# Patient Record
Sex: FEMALE | Race: WHITE | Employment: OTHER | URBAN - METROPOLITAN AREA
[De-identification: names, ages, dates, MRNs, and addresses within clinical notes are randomized per-mention and may not be internally consistent; named-entity substitution may affect disease eponyms.]

---

## 2017-12-27 ENCOUNTER — HOSPITAL ENCOUNTER (INPATIENT)
Age: 72
LOS: 6 days | Discharge: HOME HEALTH | DRG: 191 | End: 2018-01-02
Attending: EMERGENCY MEDICINE | Admitting: INTERNAL MEDICINE
Payer: MEDICARE

## 2017-12-27 ENCOUNTER — APPOINTMENT (OUTPATIENT)
Dept: GENERAL RADIOLOGY | Age: 72
DRG: 191 | End: 2017-12-27
Attending: EMERGENCY MEDICINE
Payer: MEDICARE

## 2017-12-27 DIAGNOSIS — J44.1 COPD EXACERBATION (HCC): Primary | ICD-10-CM

## 2017-12-27 PROBLEM — J40 BRONCHITIS: Status: ACTIVE | Noted: 2017-12-27

## 2017-12-27 PROBLEM — E86.0 DEHYDRATION: Status: ACTIVE | Noted: 2017-12-27

## 2017-12-27 PROBLEM — I10 HTN (HYPERTENSION): Status: ACTIVE | Noted: 2017-12-27

## 2017-12-27 LAB
ALBUMIN SERPL-MCNC: 3.5 G/DL (ref 3.4–5)
ALBUMIN/GLOB SERPL: 0.9 {RATIO} (ref 0.8–1.7)
ALP SERPL-CCNC: 112 U/L (ref 45–117)
ALT SERPL-CCNC: 23 U/L (ref 13–56)
ANION GAP SERPL CALC-SCNC: 12 MMOL/L (ref 3–18)
ARTERIAL PATENCY WRIST A: YES
AST SERPL-CCNC: 17 U/L (ref 15–37)
BASE EXCESS BLD CALC-SCNC: 3 MMOL/L
BASOPHILS # BLD: 0 K/UL (ref 0–0.06)
BASOPHILS NFR BLD: 0 % (ref 0–2)
BDY SITE: ABNORMAL
BILIRUB SERPL-MCNC: 0.5 MG/DL (ref 0.2–1)
BUN SERPL-MCNC: 13 MG/DL (ref 7–18)
BUN/CREAT SERPL: 14 (ref 12–20)
CALCIUM SERPL-MCNC: 9.8 MG/DL (ref 8.5–10.1)
CHLORIDE SERPL-SCNC: 107 MMOL/L (ref 100–108)
CK MB CFR SERPL CALC: NORMAL % (ref 0–4)
CK MB SERPL-MCNC: <1 NG/ML (ref 5–25)
CK SERPL-CCNC: 60 U/L (ref 26–192)
CO2 SERPL-SCNC: 28 MMOL/L (ref 21–32)
CREAT SERPL-MCNC: 0.94 MG/DL (ref 0.6–1.3)
DIFFERENTIAL METHOD BLD: ABNORMAL
EOSINOPHIL # BLD: 0 K/UL (ref 0–0.4)
EOSINOPHIL NFR BLD: 0 % (ref 0–5)
ERYTHROCYTE [DISTWIDTH] IN BLOOD BY AUTOMATED COUNT: 13.6 % (ref 11.6–14.5)
EST. AVERAGE GLUCOSE BLD GHB EST-MCNC: 117 MG/DL
GAS FLOW.O2 O2 DELIVERY SYS: ABNORMAL L/MIN
GLOBULIN SER CALC-MCNC: 3.7 G/DL (ref 2–4)
GLUCOSE BLD STRIP.AUTO-MCNC: 146 MG/DL (ref 70–110)
GLUCOSE BLD STRIP.AUTO-MCNC: 191 MG/DL (ref 70–110)
GLUCOSE SERPL-MCNC: 131 MG/DL (ref 74–99)
HBA1C MFR BLD: 5.7 % (ref 4.5–5.6)
HCO3 BLD-SCNC: 27.2 MMOL/L (ref 22–26)
HCT VFR BLD AUTO: 45 % (ref 35–45)
HGB BLD-MCNC: 15.1 G/DL (ref 12–16)
INR PPP: 0.9 (ref 0.8–1.2)
LACTATE SERPL-SCNC: 2.1 MMOL/L (ref 0.4–2)
LACTATE SERPL-SCNC: 2.1 MMOL/L (ref 0.4–2)
LACTATE SERPL-SCNC: 2.6 MMOL/L (ref 0.4–2)
LYMPHOCYTES # BLD: 1.1 K/UL (ref 0.9–3.6)
LYMPHOCYTES NFR BLD: 8 % (ref 21–52)
MAGNESIUM SERPL-MCNC: 1.6 MG/DL (ref 1.6–2.6)
MCH RBC QN AUTO: 30.8 PG (ref 24–34)
MCHC RBC AUTO-ENTMCNC: 33.6 G/DL (ref 31–37)
MCV RBC AUTO: 91.8 FL (ref 74–97)
MONOCYTES # BLD: 0.5 K/UL (ref 0.05–1.2)
MONOCYTES NFR BLD: 4 % (ref 3–10)
NEUTS SEG # BLD: 12.5 K/UL (ref 1.8–8)
NEUTS SEG NFR BLD: 88 % (ref 40–73)
O2/TOTAL GAS SETTING VFR VENT: 21 %
PCO2 BLD: 40.3 MMHG (ref 35–45)
PH BLD: 7.44 [PH] (ref 7.35–7.45)
PLATELET # BLD AUTO: 280 K/UL (ref 135–420)
PMV BLD AUTO: 9.8 FL (ref 9.2–11.8)
PO2 BLD: 57 MMHG (ref 80–100)
POTASSIUM SERPL-SCNC: 3.4 MMOL/L (ref 3.5–5.5)
PROT SERPL-MCNC: 7.2 G/DL (ref 6.4–8.2)
PROTHROMBIN TIME: 12 SEC (ref 11.5–15.2)
RBC # BLD AUTO: 4.9 M/UL (ref 4.2–5.3)
SAO2 % BLD: 90 % (ref 92–97)
SERVICE CMNT-IMP: ABNORMAL
SODIUM SERPL-SCNC: 147 MMOL/L (ref 136–145)
SPECIMEN TYPE: ABNORMAL
TROPONIN I SERPL-MCNC: <0.02 NG/ML (ref 0–0.06)
WBC # BLD AUTO: 14.2 K/UL (ref 4.6–13.2)

## 2017-12-27 PROCEDURE — 74011250636 HC RX REV CODE- 250/636: Performed by: EMERGENCY MEDICINE

## 2017-12-27 PROCEDURE — 94640 AIRWAY INHALATION TREATMENT: CPT

## 2017-12-27 PROCEDURE — 74011250636 HC RX REV CODE- 250/636: Performed by: INTERNAL MEDICINE

## 2017-12-27 PROCEDURE — 80053 COMPREHEN METABOLIC PANEL: CPT | Performed by: EMERGENCY MEDICINE

## 2017-12-27 PROCEDURE — 74011636637 HC RX REV CODE- 636/637: Performed by: INTERNAL MEDICINE

## 2017-12-27 PROCEDURE — 83605 ASSAY OF LACTIC ACID: CPT | Performed by: INTERNAL MEDICINE

## 2017-12-27 PROCEDURE — 83605 ASSAY OF LACTIC ACID: CPT | Performed by: EMERGENCY MEDICINE

## 2017-12-27 PROCEDURE — 85025 COMPLETE CBC W/AUTO DIFF WBC: CPT | Performed by: EMERGENCY MEDICINE

## 2017-12-27 PROCEDURE — 77030013140 HC MSK NEB VYRM -A

## 2017-12-27 PROCEDURE — 99285 EMERGENCY DEPT VISIT HI MDM: CPT

## 2017-12-27 PROCEDURE — 93005 ELECTROCARDIOGRAM TRACING: CPT

## 2017-12-27 PROCEDURE — 82803 BLOOD GASES ANY COMBINATION: CPT

## 2017-12-27 PROCEDURE — 71010 XR CHEST PORT: CPT

## 2017-12-27 PROCEDURE — 74011000250 HC RX REV CODE- 250: Performed by: INTERNAL MEDICINE

## 2017-12-27 PROCEDURE — 87040 BLOOD CULTURE FOR BACTERIA: CPT | Performed by: EMERGENCY MEDICINE

## 2017-12-27 PROCEDURE — 83036 HEMOGLOBIN GLYCOSYLATED A1C: CPT | Performed by: INTERNAL MEDICINE

## 2017-12-27 PROCEDURE — 74011250637 HC RX REV CODE- 250/637: Performed by: INTERNAL MEDICINE

## 2017-12-27 PROCEDURE — 85610 PROTHROMBIN TIME: CPT | Performed by: EMERGENCY MEDICINE

## 2017-12-27 PROCEDURE — 83735 ASSAY OF MAGNESIUM: CPT | Performed by: EMERGENCY MEDICINE

## 2017-12-27 PROCEDURE — 96374 THER/PROPH/DIAG INJ IV PUSH: CPT

## 2017-12-27 PROCEDURE — 36415 COLL VENOUS BLD VENIPUNCTURE: CPT | Performed by: INTERNAL MEDICINE

## 2017-12-27 PROCEDURE — 65660000000 HC RM CCU STEPDOWN

## 2017-12-27 PROCEDURE — 82550 ASSAY OF CK (CPK): CPT | Performed by: EMERGENCY MEDICINE

## 2017-12-27 PROCEDURE — 82962 GLUCOSE BLOOD TEST: CPT

## 2017-12-27 PROCEDURE — 74011000250 HC RX REV CODE- 250: Performed by: EMERGENCY MEDICINE

## 2017-12-27 PROCEDURE — 94762 N-INVAS EAR/PLS OXIMTRY CONT: CPT

## 2017-12-27 PROCEDURE — 36600 WITHDRAWAL OF ARTERIAL BLOOD: CPT

## 2017-12-27 RX ORDER — FLUTICASONE PROPIONATE AND SALMETEROL 500; 50 UG/1; UG/1
1 POWDER RESPIRATORY (INHALATION) EVERY 12 HOURS
COMMUNITY

## 2017-12-27 RX ORDER — ALBUTEROL SULFATE 0.83 MG/ML
2.5 SOLUTION RESPIRATORY (INHALATION)
Status: DISCONTINUED | OUTPATIENT
Start: 2017-12-27 | End: 2018-01-02 | Stop reason: HOSPADM

## 2017-12-27 RX ORDER — IPRATROPIUM BROMIDE AND ALBUTEROL SULFATE 2.5; .5 MG/3ML; MG/3ML
3 SOLUTION RESPIRATORY (INHALATION)
Status: DISCONTINUED | OUTPATIENT
Start: 2017-12-27 | End: 2018-01-02 | Stop reason: HOSPADM

## 2017-12-27 RX ORDER — PREDNISONE 20 MG/1
60 TABLET ORAL
COMMUNITY
End: 2018-01-02

## 2017-12-27 RX ORDER — PANTOPRAZOLE SODIUM 40 MG/1
40 TABLET, DELAYED RELEASE ORAL DAILY
Status: ON HOLD | COMMUNITY
End: 2017-12-27

## 2017-12-27 RX ORDER — ATORVASTATIN CALCIUM 80 MG/1
80 TABLET, FILM COATED ORAL EVERY EVENING
COMMUNITY

## 2017-12-27 RX ORDER — LANOLIN ALCOHOL/MO/W.PET/CERES
1000 CREAM (GRAM) TOPICAL DAILY
COMMUNITY

## 2017-12-27 RX ORDER — MAGNESIUM SULFATE 100 %
4 CRYSTALS MISCELLANEOUS AS NEEDED
Status: DISCONTINUED | OUTPATIENT
Start: 2017-12-27 | End: 2018-01-02 | Stop reason: HOSPADM

## 2017-12-27 RX ORDER — AMITRIPTYLINE HYDROCHLORIDE 50 MG/1
50 TABLET, FILM COATED ORAL
Status: ON HOLD | COMMUNITY
End: 2017-12-27

## 2017-12-27 RX ORDER — ALBUTEROL SULFATE 0.83 MG/ML
5 SOLUTION RESPIRATORY (INHALATION)
Status: COMPLETED | OUTPATIENT
Start: 2017-12-27 | End: 2017-12-27

## 2017-12-27 RX ORDER — MULTIVITAMIN
1 TABLET ORAL DAILY
COMMUNITY

## 2017-12-27 RX ORDER — DOXYCYCLINE 100 MG/1
100 CAPSULE ORAL EVERY 12 HOURS
Status: DISCONTINUED | OUTPATIENT
Start: 2017-12-27 | End: 2017-12-27 | Stop reason: SDUPTHER

## 2017-12-27 RX ORDER — PANTOPRAZOLE SODIUM 40 MG/1
40 TABLET, DELAYED RELEASE ORAL 2 TIMES DAILY
COMMUNITY

## 2017-12-27 RX ORDER — INSULIN LISPRO 100 [IU]/ML
INJECTION, SOLUTION INTRAVENOUS; SUBCUTANEOUS
Status: DISCONTINUED | OUTPATIENT
Start: 2017-12-27 | End: 2018-01-02 | Stop reason: HOSPADM

## 2017-12-27 RX ORDER — HEPARIN SODIUM 5000 [USP'U]/ML
5000 INJECTION, SOLUTION INTRAVENOUS; SUBCUTANEOUS EVERY 8 HOURS
Status: DISCONTINUED | OUTPATIENT
Start: 2017-12-27 | End: 2018-01-02 | Stop reason: HOSPADM

## 2017-12-27 RX ORDER — AMITRIPTYLINE HYDROCHLORIDE 25 MG/1
75 TABLET, FILM COATED ORAL
COMMUNITY
End: 2020-10-14

## 2017-12-27 RX ORDER — DOXYCYCLINE 100 MG/1
100 CAPSULE ORAL 2 TIMES DAILY
COMMUNITY
Start: 2017-12-22 | End: 2018-01-02

## 2017-12-27 RX ORDER — LANOLIN ALCOHOL/MO/W.PET/CERES
CREAM (GRAM) TOPICAL
COMMUNITY

## 2017-12-27 RX ORDER — ACETAMINOPHEN 325 MG/1
650 TABLET ORAL
Status: DISCONTINUED | OUTPATIENT
Start: 2017-12-27 | End: 2018-01-02 | Stop reason: HOSPADM

## 2017-12-27 RX ORDER — IPRATROPIUM BROMIDE AND ALBUTEROL SULFATE 2.5; .5 MG/3ML; MG/3ML
3 SOLUTION RESPIRATORY (INHALATION) ONCE
Status: COMPLETED | OUTPATIENT
Start: 2017-12-27 | End: 2017-12-27

## 2017-12-27 RX ORDER — SODIUM CHLORIDE 9 MG/ML
125 INJECTION, SOLUTION INTRAVENOUS CONTINUOUS
Status: DISCONTINUED | OUTPATIENT
Start: 2017-12-27 | End: 2017-12-30

## 2017-12-27 RX ORDER — CYANOCOBALAMIN (VITAMIN B-12) 500 MCG
400 TABLET ORAL DAILY
COMMUNITY

## 2017-12-27 RX ORDER — DOXYCYCLINE 100 MG/1
100 CAPSULE ORAL EVERY 12 HOURS
Status: DISCONTINUED | OUTPATIENT
Start: 2017-12-27 | End: 2018-01-02 | Stop reason: HOSPADM

## 2017-12-27 RX ORDER — BIOTIN 1000 MCG
1000 TABLET,CHEWABLE ORAL 4 TIMES DAILY
COMMUNITY

## 2017-12-27 RX ORDER — DEXTROSE 50 % IN WATER (D50W) INTRAVENOUS SYRINGE
25-50 AS NEEDED
Status: DISCONTINUED | OUTPATIENT
Start: 2017-12-27 | End: 2018-01-02 | Stop reason: HOSPADM

## 2017-12-27 RX ORDER — SODIUM CHLORIDE 9 MG/ML
200 INJECTION, SOLUTION INTRAVENOUS CONTINUOUS
Status: DISPENSED | OUTPATIENT
Start: 2017-12-27 | End: 2017-12-27

## 2017-12-27 RX ADMIN — METHYLPREDNISOLONE SODIUM SUCCINATE 40 MG: 40 INJECTION, POWDER, FOR SOLUTION INTRAMUSCULAR; INTRAVENOUS at 23:55

## 2017-12-27 RX ADMIN — METHYLPREDNISOLONE SODIUM SUCCINATE 40 MG: 40 INJECTION, POWDER, FOR SOLUTION INTRAMUSCULAR; INTRAVENOUS at 17:30

## 2017-12-27 RX ADMIN — IPRATROPIUM BROMIDE AND ALBUTEROL SULFATE 3 ML: .5; 3 SOLUTION RESPIRATORY (INHALATION) at 13:24

## 2017-12-27 RX ADMIN — SODIUM CHLORIDE 200 ML/HR: 900 INJECTION, SOLUTION INTRAVENOUS at 16:56

## 2017-12-27 RX ADMIN — ALBUTEROL SULFATE 5 MG: 2.5 SOLUTION RESPIRATORY (INHALATION) at 14:18

## 2017-12-27 RX ADMIN — METHYLPREDNISOLONE SODIUM SUCCINATE 125 MG: 125 INJECTION, POWDER, FOR SOLUTION INTRAMUSCULAR; INTRAVENOUS at 14:56

## 2017-12-27 RX ADMIN — IPRATROPIUM BROMIDE AND ALBUTEROL SULFATE 3 ML: .5; 3 SOLUTION RESPIRATORY (INHALATION) at 19:24

## 2017-12-27 RX ADMIN — SODIUM CHLORIDE 125 ML/HR: 900 INJECTION, SOLUTION INTRAVENOUS at 23:00

## 2017-12-27 RX ADMIN — INSULIN LISPRO 2 UNITS: 100 INJECTION, SOLUTION INTRAVENOUS; SUBCUTANEOUS at 21:08

## 2017-12-27 RX ADMIN — DOXYCYCLINE 100 MG: 100 CAPSULE ORAL at 21:01

## 2017-12-27 RX ADMIN — HEPARIN SODIUM 5000 UNITS: 5000 INJECTION, SOLUTION INTRAVENOUS; SUBCUTANEOUS at 17:28

## 2017-12-27 NOTE — IP AVS SNAPSHOT
[address] [address] 
[phone] Patient: Lydia Richards MRN: [id number] DLI:[id number] About your hospitalization You were admitted on:  December 27, 2017 You last received care in the:  [facility] You were discharged on:  January 2, 2018 Why you were hospitalized Your primary diagnosis was:  Copd Exacerbation (Hcc) Your diagnoses also included:  Bronchitis, Dehydration, Htn (Hypertension) Follow-up Information Follow up With Details Comments Contact Info Elissa Jung to continue managing your healthcare needs. [phone] Slade German MD On 1/5/2018 Follow-up appointment @ 8:00 a.m. [place] [address] 
[phone] Discharge Orders None A check bunny indicates which time of day the medication should be taken. My Medications CHANGE how you take these medications Instructions Each Dose to Equal  
 Morning Noon Evening Bedtime  
 predniSONE 10 mg tablet Commonly known as:  Zeinab Khan What changed:   
- medication strength 
- how much to take 
- how to take this - when to take this 
- additional instructions Your last dose was: Your next dose is:    
   
   
 Prednisone 10mg tabs: p.o. 4 tabs daily for 3 days then drop to  3 tabs daily for 3 days then drop to  2 tabs daily for 3 days then drop to  1 tab daily for 3 days then stop. Dispense 30 tabs CONTINUE taking these medications Instructions Each Dose to Equal  
 Morning Noon Evening Bedtime AGGRENOX  mg per SR capsule Generic drug:  aspirin-dipyridamole Your last dose was: Your next dose is: Take 1 Cap by mouth two (2) times a day. Indications: PREVENTION OF CEREBRAL THROMBOSIS, h/o TIA  
 1 Cap  
    
   
   
   
  
 albuterol 90 mcg/actuation inhaler Commonly known as:  Sujatha Payton Your last dose was: Your next dose is: Take 2 Puffs by inhalation every six (6) hours as needed. 2 Puff  
    
   
   
   
  
 amitriptyline 25 mg tablet Commonly known as:  ELAVIL Your last dose was: Your next dose is: Take 75 mg by mouth nightly. Indications: MIGRAINE PREVENTION  
 75 mg  
    
   
   
   
  
 atenolol 25 mg tablet Commonly known as:  TENORMIN Your last dose was: Your next dose is: Take 25 mg by mouth every evening. Indications: MIGRAINE PREVENTION, to manage SE of amitriptyline 25 mg  
    
   
   
   
  
 biotin 1,000 mcg Chew Your last dose was: Your next dose is: Take 2,000 mcg by mouth two (2) times a day. This herbal, alternative, and/or nutritional/dietary supplement product will not be given during hospital stay per [facility] approved policy. Please reorder upon discharge if appropriate. 2000 mcg  
    
   
   
   
  
 calcium-cholecalciferol (D3) tablet Commonly known as:  CALTRATE 600+D Your last dose was: Your next dose is: Take 1 Tab by mouth daily. 1 Tab  
    
   
   
   
  
 cholecalciferol 400 unit Tab tablet Commonly known as:  VITAMIN D3 Your last dose was: Your next dose is: Take 400 Units by mouth daily. 400 Units  
    
   
   
   
  
 cyanocobalamin 1,000 mcg tablet Your last dose was: Your next dose is: Take 1,000 mcg by mouth daily. 1000 mcg  
    
   
   
   
  
 fluticasone-salmeterol 500-50 mcg/dose diskus inhaler Commonly known as:  ADVAIR Your last dose was: Your next dose is: Take 1 Puff by inhalation every twelve (12) hours. 1 Puff Iron 325 mg (65 mg iron) tablet Generic drug:  ferrous sulfate Your last dose was: Your next dose is: Take  by mouth Daily (before breakfast). LIPITOR 80 mg tablet Generic drug:  atorvastatin Your last dose was: Your next dose is: Take 80 mg by mouth every evening. 80 mg PROTONIX 40 mg tablet Generic drug:  pantoprazole Your last dose was: Your next dose is: Take 40 mg by mouth two (2) times a day. 40 mg  
    
   
   
   
  
 SPIRIVA WITH HANDIHALER 18 mcg inhalation capsule Generic drug:  tiotropium Your last dose was: Your next dose is: Take 1 Cap by inhalation daily. 1 Cap ZETIA 10 mg tablet Generic drug:  ezetimibe Your last dose was: Your next dose is: Take 10 mg by mouth every evening. 10 mg  
    
   
   
   
  
  
STOP taking these medications   
 doxycycline 100 mg capsule Commonly known as:  Lucien  ZANTAC 150 mg tablet Generic drug:  raNITIdine Where to Get Your Medications Information on where to get these meds will be given to you by the nurse or doctor. ! Ask your nurse or doctor about these medications  
  predniSONE 10 mg tablet My Medications STOP taking these medications   
 doxycycline 100 mg capsule Commonly known as:  Lucien  ZANTAC 150 mg tablet Generic drug:  raNITIdine TAKE these medications as instructed Instructions Each Dose to Equal  
 Morning Noon Evening Bedtime AGGRENOX  mg per SR capsule Generic drug:  aspirin-dipyridamole Your last dose was: Your next dose is: Take 1 Cap by mouth two (2) times a day. Indications: PREVENTION OF CEREBRAL THROMBOSIS, h/o TIA  
 1 Cap  
    
   
   
   
  
 albuterol 90 mcg/actuation inhaler Commonly known as:  Tamara Goodpasture Your last dose was: Your next dose is: Take 2 Puffs by inhalation every six (6) hours as needed. 2 Puff  
    
   
   
   
  
 amitriptyline 25 mg tablet Commonly known as:  ELAVIL Your last dose was: Your next dose is: Take 75 mg by mouth nightly. Indications: MIGRAINE PREVENTION  
 75 mg  
    
   
   
   
  
 atenolol 25 mg tablet Commonly known as:  TENORMIN Your last dose was: Your next dose is: Take 25 mg by mouth every evening. Indications: MIGRAINE PREVENTION, to manage SE of amitriptyline 25 mg  
    
   
   
   
  
 biotin 1,000 mcg Chew Your last dose was: Your next dose is: Take 2,000 mcg by mouth two (2) times a day. This herbal, alternative, and/or nutritional/dietary supplement product will not be given during hospital stay per [facility] approved policy. Please reorder upon discharge if appropriate. 2000 mcg  
    
   
   
   
  
 calcium-cholecalciferol (D3) tablet Commonly known as:  CALTRATE 600+D Your last dose was: Your next dose is: Take 1 Tab by mouth daily. 1 Tab  
    
   
   
   
  
 cholecalciferol 400 unit Tab tablet Commonly known as:  VITAMIN D3 Your last dose was: Your next dose is: Take 400 Units by mouth daily. 400 Units  
    
   
   
   
  
 cyanocobalamin 1,000 mcg tablet Your last dose was: Your next dose is: Take 1,000 mcg by mouth daily. 1000 mcg  
    
   
   
   
  
 fluticasone-salmeterol 500-50 mcg/dose diskus inhaler Commonly known as:  ADVAIR Your last dose was: Your next dose is: Take 1 Puff by inhalation every twelve (12) hours. 1 Puff Iron 325 mg (65 mg iron) tablet Generic drug:  ferrous sulfate Your last dose was: Your next dose is: Take  by mouth Daily (before breakfast). LIPITOR 80 mg tablet Generic drug:  atorvastatin Your last dose was: Your next dose is: Take 80 mg by mouth every evening. 80 mg  
    
   
   
   
  
 predniSONE 10 mg tablet Commonly known as:  Clive Seals Your last dose was: Your next dose is:    
   
   
 Prednisone 10mg tabs: p.o. 4 tabs daily for 3 days then drop to  3 tabs daily for 3 days then drop to  2 tabs daily for 3 days then drop to  1 tab daily for 3 days then stop. Dispense 30 tabs PROTONIX 40 mg tablet Generic drug:  pantoprazole Your last dose was: Your next dose is: Take 40 mg by mouth two (2) times a day. 40 mg  
    
   
   
   
  
 SPIRIVA WITH HANDIHALER 18 mcg inhalation capsule Generic drug:  tiotropium Your last dose was: Your next dose is: Take 1 Cap by inhalation daily. 1 Cap ZETIA 10 mg tablet Generic drug:  ezetimibe Your last dose was: Your next dose is: Take 10 mg by mouth every evening. 10 mg Where to Get Your Medications Information on where to get these meds will be given to you by the nurse or doctor. ! Ask your nurse or doctor about these medications  
  predniSONE 10 mg tablet Discharge Instructions DISCHARGE SUMMARY from Nurse PATIENT INSTRUCTIONS: 
 
After general anesthesia or intravenous sedation, for 24 hours or while taking prescription Narcotics: · Limit your activities · Do not drive and operate hazardous machinery · Do not make important personal or business decisions · Do  not drink alcoholic beverages · If you have not urinated within 8 hours after discharge, please contact your surgeon on call. What to do at Home: 
Recommended activity: Activity as tolerated, Any questions If you experience any of the following symptoms: increased pain or shortness of breath, please follow up with your PCP. *  Please give a list of your current medications to your Primary Care Provider. *  Please update this list whenever your medications are discontinued, doses are 
    changed, or new medications (including over-the-counter products) are added. *  Please carry medication information at all times in case of emergency situations. These are general instructions for a healthy lifestyle: No smoking/ No tobacco products/ Avoid exposure to second hand smoke Surgeon General's Warning:  Quitting smoking now greatly reduces serious risk to your health. Obesity, smoking, and sedentary lifestyle greatly increases your risk for illness A healthy diet, regular physical exercise & weight monitoring are important for maintaining a healthy lifestyle You may be retaining fluid if you have a history of heart failure or if you experience any of the following symptoms:  Weight gain of 3 pounds or more overnight or 5 pounds in a week, increased swelling in our hands or feet or shortness of breath while lying flat in bed. Please call your doctor as soon as you notice any of these symptoms; do not wait until your next office visit. Recognize signs and symptoms of STROKE: 
 
F-face looks uneven A-arms unable to move or move unevenly S-speech slurred or non-existent T-time-call 911 as soon as signs and symptoms begin-DO NOT go Back to bed or wait to see if you get better-TIME IS BRAIN. Warning Signs of HEART ATTACK Call 911 if you have these symptoms: 
? Chest discomfort. Most heart attacks involve discomfort in the center of the chest that lasts more than a few minutes, or that goes away and comes back. It can feel like uncomfortable pressure, squeezing, fullness, or pain. ? Discomfort in other areas of the upper body. Symptoms can include pain or discomfort in one or both arms, the back, neck, jaw, or stomach. ? Shortness of breath with or without chest discomfort. ? Other signs may include breaking out in a cold sweat, nausea, or lightheadedness. Don't wait more than five minutes to call [address]! Fast action can save your life. Calling 911 is almost always the fastest way to get lifesaving treatment. Emergency Medical Services staff can begin treatment when they arrive  up to an hour sooner than if someone gets to the hospital by car. The discharge information has been reviewed with the {PATIENT PARENT GUARDIAN:14319}. The {PATIENT PARENT GUARDIAN:78006} verbalized understanding. Discharge medications reviewed with the {Dishcarge meds reviewed CUSX:62979} and appropriate educational materials and side effects teaching were provided. ___________________________________________________________________________________________________________________________________ Chronic Obstructive Pulmonary Disease (COPD): Care Instructions Your Care Instructions Chronic obstructive pulmonary disease (COPD) is a general term for a group of lung diseases, including emphysema and chronic bronchitis. People with COPD have decreased airflow in and out of the lungs, which makes it hard to breathe. The airways also can get clogged with thick mucus. Cigarette smoking is a major cause of COPD. Although there is no cure for COPD, you can slow its progress. Following your treatment plan and taking care of yourself can help you feel better and live longer. Follow-up care is a key part of your treatment and safety. Be sure to make and go to all appointments, and call your doctor if you are having problems. It's also a good idea to know your test results and keep a list of the medicines you take. How can you care for yourself at home? ?Staying healthy ? · Do not smoke. This is the most important step you can take to prevent more damage to your lungs.  If you need help quitting, talk to your doctor about stop-smoking programs and medicines. These can increase your chances of quitting for good. ? · Avoid colds and flu. Get a pneumococcal vaccine shot. If you have had one before, ask your doctor whether you need a second dose. Get the flu vaccine every fall. If you must be around people with colds or the flu, wash your hands often. ? · Avoid secondhand smoke, air pollution, and high altitudes. Also avoid cold, dry air and hot, humid air. Stay at home with your windows closed when air pollution is bad. ?Medicines and oxygen therapy ? · Take your medicines exactly as prescribed. Call your doctor if you think you are having a problem with your medicine. ? · You may be taking medicines such as: ¨ Bronchodilators. These help open your airways and make breathing easier. Bronchodilators are either short-acting (work for 6 to 9 hours) or long-acting (work for 24 hours). You inhale most bronchodilators, so they start to act quickly. Always carry your quick-relief inhaler with you in case you need it while you are away from home. ¨ Corticosteroids (prednisone, budesonide). These reduce airway inflammation. They come in pill or inhaled form. You must take these medicines every day for them to work well. ? · A spacer may help you get more inhaled medicine to your lungs. Ask your doctor or pharmacist if a spacer is right for you. If it is, ask how to use it properly. ? · Do not take any vitamins, over-the-counter medicine, or herbal products without talking to your doctor first.  
? · If your doctor prescribed antibiotics, take them as directed. Do not stop taking them just because you feel better. You need to take the full course of antibiotics. ? · Oxygen therapy boosts the amount of oxygen in your blood and helps you breathe easier. Use the flow rate your doctor has recommended, and do not change it without talking to your doctor first.  
Activity ? · Get regular exercise. Walking is an easy way to get exercise. Start out slowly, and walk a little more each day. ? · Pay attention to your breathing. You are exercising too hard if you cannot talk while you are exercising. ? · Take short rest breaks when doing household chores and other activities. ? · Learn breathing methods-such as breathing through pursed lips-to help you become less short of breath. ? · If your doctor has not set you up with a pulmonary rehabilitation program, talk to him or her about whether rehab is right for you. Rehab includes exercise programs, education about your disease and how to manage it, help with diet and other changes, and emotional support. Diet ? · Eat regular, healthy meals. Use bronchodilators about 1 hour before you eat to make it easier to eat. Eat several small meals instead of three large ones. Drink beverages at the end of the meal. Avoid foods that are hard to chew. ? · Eat foods that contain protein so that you do not lose muscle mass. ? · Talk with your doctor if you gain too much weight or if you lose weight without trying. ?Mental health ? · Talk to your family, friends, or a therapist about your feelings. It is normal to feel frightened, angry, hopeless, helpless, and even guilty. Talking openly about bad feelings can help you cope. If these feelings last, talk to your doctor. When should you call for help? Call 911 anytime you think you may need emergency care. For example, call if: 
? · You have severe trouble breathing. ?Call your doctor now or seek immediate medical care if: 
? · You have new or worse trouble breathing. ? · You cough up blood. ? · You have a fever. ? Watch closely for changes in your health, and be sure to contact your doctor if: 
? · You cough more deeply or more often, especially if you notice more mucus or a change in the color of your mucus. ? · You have new or worse swelling in your legs or belly. ? · You are not getting better as expected. Where can you learn more? Go to http://jessica-esa.info/. Frances Ahumada in the search box to learn more about \"Chronic Obstructive Pulmonary Disease (COPD): Care Instructions. \" Current as of: May 12, 2017 Content Version: 11.4 © 8434-5675 [employer]. Care instructions adapted under license by [employer] (which disclaims liability or warranty for this information). If you have questions about a medical condition or this instruction, always ask your healthcare professional. [address] any warranty or liability for your use of this information. Introducing [facility]! Dear Crystal Horn: Thank you for requesting a [employer] account. Our records indicate that you already have an active [employer] account. You can access your account anytime at [email or web address] Did you know that you can access your hospital and ER discharge instructions at any time in [employer]? You can also review all of your test results from your hospital stay or ER visit. Additional Information If you have questions, please visit the Frequently Asked Questions section of the [employer] website at [email or web address] Remember, [employer] is NOT to be used for urgent needs. For medical emergencies, dial 911. Now available from your iPhone and Android! Providers Seen During Your Hospitalization Provider Specialty Primary office phone Ron Roth MD Emergency Medicine [phone] Bertha Maya MD Internal Medicine [phone] Your Primary Care Physician (PCP) Primary Care Physician Office Phone Office Fax Antonio Friedman, [address] [phone] You are allergic to the following Allergen Reactions Augmentin (Amoxicillin-Pot Clavulanate) Diarrhea Entex (Phenylephrine-Guaifenesin) Diarrhea Levaquin (Levofloxacin) Palpitations Neurontin (Gabapentin) Other (comments) Slurred speech Recent Documentation Height Weight BMI OB Status Smoking Status 1.727 m 86.9 kg 29.13 kg/m2 Hysterectomy Former Smoker Emergency Contacts Name Discharge Info Relation Home Work Mobile Sha Raymundo DISCHARGE CAREGIVER [3] Spouse [3] [phone] Patient Belongings The following personal items are in your possession at time of discharge: 
     Visual Aid: Glasses, With patient          Jewelry: Ring, Watch  Clothing: Pants, Undergarments, Shirt, Footwear    Other Valuables: Cassandra Johnson Please provide this summary of care documentation to your next provider. Signatures-by signing, you are acknowledging that this After Visit Summary has been reviewed with you and you have received a copy. Patient Signature:  ____________________________________________________________ Date:  ____________________________________________________________  
  
Shahzad Mealing Provider Signature:  ____________________________________________________________ Date:  ____________________________________________________________

## 2017-12-27 NOTE — ED PROVIDER NOTES
EMERGENCY DEPARTMENT HISTORY AND PHYSICAL EXAM    Date: 12/27/2017  Patient Name: Mary Jo Mcdonald    History of Presenting Illness     Chief Complaint   Patient presents with    Shortness of Breath         History Provided By: Patient    Chief Complaint: SOB  Duration: 4 Days  Timing:  Progressive and Worsening  Location: N/A  Quality: N/A  Severity: 0 out of 10  Associated Symptoms: wheezing, fever, voice hoarseness, difficult speech    Additional History (Context):   12:52 PM  Mary Jo Mcdonald is a 81 y.o. female with PMHX of COPD (Nebulizer at home) and TIA (pt on blood thinners) who presents to the emergency department C/O progressively worsening SOB onset 4 days ago. Associated sxs include wheezing, fever since last week, voice hoarseness, and difficult speech onset 3 days ago. Pt has bene hospitalized in the past for this 19 years ago. Pt was started on Prednisone and Doxycycline when seen at Urgent [facility] ([facility]). Pt had her flu shot this year. Allergies reported to Augmentin, Entex, Levaquin, and Neurontin. Other PMHx includes GERD. PSHx includes ALDO, Orthopedic and HEENT surgery. Pt is a former cigarette smoker and a non EtOH user. Pt denies leg swelling and any other sxs or complaints. Arabella Rosas    PCP: Joel Clements MD    Current Outpatient Prescriptions   Medication Sig Dispense Refill    cyanocobalamin 1,000 mcg tablet Take 1,000 mcg by mouth daily.  pantoprazole (PROTONIX) 40 mg tablet Take 40 mg by mouth daily.  atorvastatin (LIPITOR) 80 mg tablet Take 80 mg by mouth daily.  ferrous sulfate (IRON) 325 mg (65 mg iron) tablet Take  by mouth Daily (before breakfast).  amitriptyline (ELAVIL) 50 mg tablet Take 50 mg by mouth nightly.  ranitidine (ZANTAC) 150 mg tablet Take 150 mg by mouth nightly.  atenolol (TENORMIN) 25 mg tablet Take 25 mg by mouth daily.  Indications: MIGRAINE PREVENTION      dipyridamole-aspirin (AGGRENOX) 200-25 mg per SR capsule Take 1 Cap by mouth two (2) times a day.  ezetimibe (ZETIA) 10 mg tablet Take  by mouth.  tiotropium (SPIRIVA WITH HANDIHALER) 18 mcg inhalation capsule Take 1 Cap by inhalation daily.  fluticasone-salmeterol (ADVAIR DISKUS) 250-50 mcg/dose diskus inhaler Take 1 Puff by inhalation every twelve (12) hours.  albuterol (PROVENTIL, VENTOLIN) 90 mcg/Actuation inhaler Take 2 Puffs by inhalation every six (6) hours as needed.  BIOTIN PO Take  by mouth.  CALCIUM CARB/VIT D3/MINERALS (CALTRATE PLUS PO) Take  by mouth. Past History     Past Medical History:  Past Medical History:   Diagnosis Date    COPD     GERD (gastroesophageal reflux disease)     well controlled with meds    Stroke ([facility] [address] 2009    TIA       Past Surgical History:  Past Surgical History:   Procedure Laterality Date    HX CARPAL TUNNEL RELEASE      HX CHOLECYSTECTOMY      HX CYST REMOVAL      HX HEENT      tonsils    HX HYSTERECTOMY      HX ORTHOPAEDIC      carpal tunnel right    HX ORTHOPAEDIC      left shoulder surgery    HX ALDO AND BSO      NEUROLOGICAL PROCEDURE UNLISTED      neck surgery       Family History:  Family History   Problem Relation Age of Onset    Diabetes Father     Hypertension Father     Heart Disease Father     Stroke Father     Cancer Sister     Post-op Nausea/Vomiting Mother     Malignant Hyperthermia Neg Hx     Pseudocholinesterase Deficiency Neg Hx     Delayed Awakening Neg Hx     Emergence Delirium Neg Hx     Post-op Cognitive Dysfunction Neg Hx        Social History:  Social History   Substance Use Topics    Smoking status: Former Smoker     Packs/day: 1.00    Smokeless tobacco: Never Used    Alcohol use No       Allergies:   Allergies   Allergen Reactions    Augmentin [Amoxicillin-Pot Clavulanate] Diarrhea    Entex [Phenylephrine-Guaifenesin] Diarrhea    Levaquin [Levofloxacin] Palpitations    Neurontin [Gabapentin] Other (comments)     Slurred speech         Review of Systems   Review of Systems   Constitutional: Positive for fever. HENT: Positive for voice change (hoarseness). Respiratory: Positive for shortness of breath and wheezing. Cardiovascular: Negative for leg swelling. Neurological: Positive for speech difficulty. All other systems reviewed and are negative. Physical Exam     Vitals:    12/27/17 1257 12/27/17 1325 12/27/17 1419   BP: 123/81     Pulse: (!) 118     Resp: 20     Temp: 98.1 °F (36.7 °C)     SpO2: 91% 91% 98%   Weight: 81.6 kg (180 lb)     Height: 5' 8\" (1.727 m)       Physical Exam   Constitutional: She is oriented to person, place, and time. She appears well-developed and well-nourished. Appears in some mild respiratory distress. HENT:   Head: Normocephalic and atraumatic. Eyes: Pupils are equal, round, and reactive to light. Neck: Neck supple. Cardiovascular: Normal rate, regular rhythm, S1 normal, S2 normal and normal heart sounds. Pulmonary/Chest: No respiratory distress. She has wheezes. She has no rales. She exhibits no tenderness. Diffuse expiratory wheezes. Pulse ox was 89% ORA. Abdominal: Soft. She exhibits no distension and no mass. There is no tenderness. There is no guarding. Musculoskeletal: Normal range of motion. She exhibits no edema or tenderness. Neurological: She is alert and oriented to person, place, and time. No cranial nerve deficit. Skin: No rash noted. Psychiatric: She has a normal mood and affect. Her behavior is normal. Thought content normal.   Nursing note and vitals reviewed.         Diagnostic Study Results     Labs -     Recent Results (from the past 12 hour(s))   CBC WITH AUTOMATED DIFF    Collection Time: 12/27/17  1:23 PM   Result Value Ref Range    WBC 14.2 (H) 4.6 - 13.2 K/uL    RBC 4.90 4.20 - 5.30 M/uL    HGB 15.1 12.0 - 16.0 g/dL    HCT 45.0 35.0 - 45.0 %    MCV 91.8 74.0 - 97.0 FL    MCH 30.8 24.0 - 34.0 PG    MCHC 33.6 31.0 - 37.0 g/dL    RDW 13.6 11.6 - 14.5 %    PLATELET 280 135 - 420 K/uL    MPV 9.8 9.2 - 11.8 FL    NEUTROPHILS 88 (H) 40 - 73 %    LYMPHOCYTES 8 (L) 21 - 52 %    MONOCYTES 4 3 - 10 %    EOSINOPHILS 0 0 - 5 %    BASOPHILS 0 0 - 2 %    ABS. NEUTROPHILS 12.5 (H) 1.8 - 8.0 K/UL    ABS. LYMPHOCYTES 1.1 0.9 - 3.6 K/UL    ABS. MONOCYTES 0.5 0.05 - 1.2 K/UL    ABS. EOSINOPHILS 0.0 0.0 - 0.4 K/UL    ABS. BASOPHILS 0.0 0.0 - 0.06 K/UL    DF AUTOMATED     CARDIAC PANEL,(CK, CKMB & TROPONIN)    Collection Time: 12/27/17  1:23 PM   Result Value Ref Range    CK 60 26 - 192 U/L    CK - MB <1.0 <3.6 ng/ml    CK-MB Index  0.0 - 4.0 %     CALCULATION NOT PERFORMED WHEN RESULT IS BELOW LINEAR LIMIT    Troponin-I, Qt. <0.02 0.00 - 0.57 NG/ML   METABOLIC PANEL, COMPREHENSIVE    Collection Time: 12/27/17  1:23 PM   Result Value Ref Range    Sodium 147 (H) 136 - 145 mmol/L    Potassium 3.4 (L) 3.5 - 5.5 mmol/L    Chloride 107 100 - 108 mmol/L    CO2 28 21 - 32 mmol/L    Anion gap 12 3.0 - 18 mmol/L    Glucose 131 (H) 74 - 99 mg/dL    BUN 13 7.0 - 18 MG/DL    Creatinine 0.94 0.6 - 1.3 MG/DL    BUN/Creatinine ratio 14 12 - 20      GFR est AA >60 >60 ml/min/1.73m2    GFR est non-AA 59 (L) >60 ml/min/1.73m2    Calcium 9.8 8.5 - 10.1 MG/DL    Bilirubin, total 0.5 0.2 - 1.0 MG/DL    ALT (SGPT) 23 13 - 56 U/L    AST (SGOT) 17 15 - 37 U/L    Alk.  phosphatase 112 45 - 117 U/L    Protein, total 7.2 6.4 - 8.2 g/dL    Albumin 3.5 3.4 - 5.0 g/dL    Globulin 3.7 2.0 - 4.0 g/dL    A-G Ratio 0.9 0.8 - 1.7     LACTIC ACID    Collection Time: 12/27/17  1:23 PM   Result Value Ref Range    Lactic acid 2.1 (HH) 0.4 - 2.0 MMOL/L   MAGNESIUM    Collection Time: 12/27/17  1:23 PM   Result Value Ref Range    Magnesium 1.6 1.6 - 2.6 mg/dL   PROTHROMBIN TIME + INR    Collection Time: 12/27/17  1:23 PM   Result Value Ref Range    Prothrombin time 12.0 11.5 - 15.2 sec    INR 0.9 0.8 - 1.2     EKG, 12 LEAD, INITIAL    Collection Time: 12/27/17  1:46 PM   Result Value Ref Range    Ventricular Rate 97 BPM    Atrial Rate 97 BPM    P-R Interval 124 ms    QRS Duration 118 ms    Q-T Interval 364 ms    QTC Calculation (Bezet) 462 ms    Calculated P Axis 82 degrees    Calculated R Axis -83 degrees    Calculated T Axis 47 degrees    Diagnosis       Normal sinus rhythm  Left axis deviation  Right bundle branch block  Inferior infarct (cited on or before 19-MAR-2013)  Abnormal ECG  When compared with ECG of 19-MAR-2013 14:08,  QRS axis shifted left  Questionable change in initial forces of Inferior leads     POC G3    Collection Time: 12/27/17  2:06 PM   Result Value Ref Range    Device: ROOM AIR      FIO2 (POC) 21 %    pH (POC) 7.437 7.35 - 7.45      pCO2 (POC) 40.3 35.0 - 45.0 MMHG    pO2 (POC) 57 (L) 80 - 100 MMHG    HCO3 (POC) 27.2 (H) 22 - 26 MMOL/L    sO2 (POC) 90 (L) 92 - 97 %    Base excess (POC) 3 mmol/L    Allens test (POC) YES      Site LEFT RADIAL      Specimen type (POC) ARTERIAL      Performed by Saray Calzada        Radiologic Studies -   XR CHEST PORT    (Results Pending)   3:22 PM  RADIOLOGY FINDINGS  Chest X-ray shows no acute distress. Pending review by Radiologist  Recorded by Albert Sung ED Scribe, as dictated by Neli Neves MD     CT Results  (Last 48 hours)    None        CXR Results  (Last 48 hours)    None          Medications given in the ED-  Medications   methylPREDNISolone (PF) (SOLU-MEDROL) injection 125 mg (125 mg IntraVENous Given 12/27/17 1456)   albuterol-ipratropium (DUO-NEB) 2.5 MG-0.5 MG/3 ML (3 mL Nebulization Given 12/27/17 1324)   albuterol (PROVENTIL VENTOLIN) nebulizer solution 5 mg (5 mg Nebulization Given 12/27/17 1418)         Medical Decision Making   I am the first provider for this patient. I reviewed the vital signs, available nursing notes, past medical history, past surgical history, family history and social history. Vital Signs-Reviewed the patient's vital signs. Pulse Oximetry Analysis - 91% on room air.      Cardiac Monitor:  Rate: 97 bpm  Rhythm: sinus rhythm    EKG interpretation: (Preliminary)  1:46 PM   NSR at 97 bpm. LAD. RBBB. EKG read by Yeison Mcneal MD at 1:46 PM     Records Reviewed: Nursing Notes    Provider Notes (Medical Decision Making): INITIAL CLINICAL IMPRESSION and PLANS:  The patient presents with the primary complaint(s) of: SOB. The presentation, to include historical aspects and clinical findings are consistent with the DX of COPD exacerbation. However, other possible DX's to consider as primary, associated with, or exacerbated by include:    1. PNA, MI, PTX, Pericarditis, and sepsis    Considering the above, my initial management plan to evaluate and therapeutic interventions include the following and as noted in the orders:    1. CXR  2. ABG, CBC, Cardiac Panel, Lactic Acid, Mg++, Prothrombin Time + INR  3. EKG     Recorded by Bonnie Calderon, ED Scribe, as dictated by Yeison Mcneal MD.     Procedures:  Procedures    ED Course:   12:52 PM Initial assessment performed. The patients presenting problems have been discussed, and they are in agreement with the care plan formulated and outlined with them. I have encouraged them to ask questions as they arise throughout their visit. 3:17 PM Discussed patient's history, exam, and available diagnostics results with Abner Dempsey MD, internal medicine, who agree with admitting pt to telemetry. Diagnosis and Disposition       Critical Care Time: 3:23 PM  I have spent 45 minutes of critical care time involved in lab review, consultations with specialist, family decision-making, and documentation. During this entire length of time I was immediately available to the patient. Critical Care: The reason for providing this level of medical care for this critically ill patient was due a critical illness that impaired one or more vital organ systems such that there was a high probability of imminent or life threatening deterioration in the patients condition.  This care involved high complexity decision making to assess, manipulate, and support vital system functions, to treat this degreee vital organ system failure and to prevent further life threatening deterioration of the patients condition. Core Measures:  For Hospitalized Patients:    1. Hospitalization Decision Time:  The decision to hospitalize the patient was made by Vasu Fernando MD at 3:22 PM on 12/27/2017    2. Aspirin: Aspirin was not given because the patient did not present with a stroke at the time of their Emergency Department evaluation    3:22 PM  Patient is being admitted to the hospital by Paulina Starkey MD. The results of their tests and reasons for their admission have been discussed with them and/or available family. They convey agreement and understanding for the need to be admitted and for their admission diagnosis. CONDITIONS ON ADMISSION:  Sepsis is not present at the time of admission. Deep Vein Thrombosis is not present at the time of admission. Thrombosis is not present at the time of admission. Pneumonia is not present at the time of admission. MRSA is not present at the time of admission. Wound infection is not present at the time of admission. Pressure Ulcer is not present at the time of admission. CLINICAL IMPRESSION:    1. COPD exacerbation ([address]        PLAN:  1. ADMIT  _______________________________    Attestations: This note is prepared by Vilma Cowart, acting as Scribe for Vasu Fernando MD.    Vasu Fernando MD:  The scribe's documentation has been prepared under my direction and personally reviewed by me in its entirety.   I confirm that the note above accurately reflects all work, treatment, procedures, and medical decision making performed by me.  _______________________________

## 2017-12-27 NOTE — PROGRESS NOTES
Admission Medication Reconciliation has been performed on this ED patient consisting of interview of the patient regarding their PTA Home Medication List, Allergies and PMH as well as obtaining outpatient pharmacy information. Interviewed patient who was a good historian and is health literate. Patient did  provide a written list of home medications. [facility]  Smoking status is quit 1998  Alcohol use denies  Illicit drug use denies  Patient ABX use within the past 30 days = current bronchitis tx doxycycline 100mg BID 12.22 in addition to prednisone 60mg daily  Has patient received any antineoplastics in the past 30 days? na  Has patient received any radiation treatments in the past 45 days? na                 Medication Compliance Issues and/or Medication Concerns: pt taking atenolol for side effects caused by migraine proph med amitriptyline.     [address] [place] [address]. Pharmacist  [phone]

## 2017-12-27 NOTE — IP AVS SNAPSHOT
[address] [place] [id number] 
[phone] Patient: Jimena Chávez MRN: [id number] [facility] [place] My Medications STOP taking these medications   
 doxycycline 100 mg capsule Commonly known as:  Miguel Jews ZANTAC 150 mg tablet Generic drug:  raNITIdine TAKE these medications as instructed Instructions Each Dose to Equal  
 Morning Noon Evening Bedtime AGGRENOX  mg per SR capsule Generic drug:  aspirin-dipyridamole Your last dose was: Your next dose is: Take 1 Cap by mouth two (2) times a day. Indications: PREVENTION OF CEREBRAL THROMBOSIS, h/o TIA  
 1 Cap  
    
   
   
   
  
 albuterol 90 mcg/actuation inhaler Commonly known as:  Ari Richards Your last dose was: Your next dose is: Take 2 Puffs by inhalation every six (6) hours as needed. 2 Puff  
    
   
   
   
  
 amitriptyline 25 mg tablet Commonly known as:  ELAVIL Your last dose was: Your next dose is: Take 75 mg by mouth nightly. Indications: MIGRAINE PREVENTION  
 75 mg  
    
   
   
   
  
 atenolol 25 mg tablet Commonly known as:  TENORMIN Your last dose was: Your next dose is: Take 25 mg by mouth every evening. Indications: MIGRAINE PREVENTION, to manage SE of amitriptyline 25 mg  
    
   
   
   
  
 biotin 1,000 mcg Chew Your last dose was: Your next dose is: Take 2,000 mcg by mouth two (2) times a day. This herbal, alternative, and/or nutritional/dietary supplement product will not be given during hospital stay per [facility] approved policy. Please reorder upon discharge if appropriate. 2000 mcg  
    
   
   
   
  
 calcium-cholecalciferol (D3) tablet Commonly known as:  CALTRATE 600+D Your last dose was: Your next dose is: Take 1 Tab by mouth daily. 1 Tab  
    
   
   
   
  
 cholecalciferol 400 unit Tab tablet Commonly known as:  VITAMIN D3 Your last dose was: Your next dose is: Take 400 Units by mouth daily. 400 Units  
    
   
   
   
  
 cyanocobalamin 1,000 mcg tablet Your last dose was: Your next dose is: Take 1,000 mcg by mouth daily. 1000 mcg  
    
   
   
   
  
 fluticasone-salmeterol 500-50 mcg/dose diskus inhaler Commonly known as:  ADVAIR Your last dose was: Your next dose is: Take 1 Puff by inhalation every twelve (12) hours. 1 Puff Iron 325 mg (65 mg iron) tablet Generic drug:  ferrous sulfate Your last dose was: Your next dose is: Take  by mouth Daily (before breakfast). LIPITOR 80 mg tablet Generic drug:  atorvastatin Your last dose was: Your next dose is: Take 80 mg by mouth every evening. 80 mg  
    
   
   
   
  
 predniSONE 10 mg tablet Commonly known as:  Sylvester Connors Your last dose was: Your next dose is:    
   
   
 Prednisone 10mg tabs: p.o. 4 tabs daily for 3 days then drop to  3 tabs daily for 3 days then drop to  2 tabs daily for 3 days then drop to  1 tab daily for 3 days then stop. Dispense 30 tabs PROTONIX 40 mg tablet Generic drug:  pantoprazole Your last dose was: Your next dose is: Take 40 mg by mouth two (2) times a day. 40 mg  
    
   
   
   
  
 SPIRIVA WITH HANDIHALER 18 mcg inhalation capsule Generic drug:  tiotropium Your last dose was: Your next dose is: Take 1 Cap by inhalation daily. 1 Cap ZETIA 10 mg tablet Generic drug:  ezetimibe Your last dose was: Your next dose is: Take 10 mg by mouth every evening.   
 10 mg  
    
   
   
 Where to Get Your Medications Information on where to get these meds will be given to you by the nurse or doctor. ! Ask your nurse or doctor about these medications  
  predniSONE 10 mg tablet

## 2017-12-27 NOTE — H&P
[facility] [place]  HISTORY AND PHYSICAL      Ana Chahal  MR#: [id number]  : 1945  ACCOUNT #: [de-identified]   ADMIT DATE: 2017    PRIMARY CARE PROVIDER:  Dr. Rambo Holcomb    CHIEF COMPLAINT:  Shortness of breath. HISTORY OF PRESENT ILLNESS:  The patient is a 68-year-old patient with a past medical history of COPD. She comes into the emergency room with worsening shortness of breath, increased sputum production, increased inhaler use and wheezing. It has been present for about a week. She takes inhalers at home for her COPD and usually does pretty good. Her last hospitalization for COPD was greater than 10 years ago. In the ER she was evaluated and started on steroids and nebulized bronchodilators. She has not progressed as we would hope. We have been asked to bring her into the hospital for continuation of care. PAST MEDICAL HISTORY:  COPD, GERD, dyslipidemia, anemia, stroke, osteoarthritis. MEDICATION ALLERGIES:  AMOXICILLIN, DIARRHEA; LEVAQUIN, PALPITATIONS; NEURONTIN, SLURRED SPEECH. SOCIAL HISTORY:  No alcohol, drugs or tobacco.  She used to smoke cigarettes, but quit over 10 years ago. FAMILY MEDICAL HISTORY:  Heart disease. OUTPATIENT MEDICATIONS:  B12, Protonix, Lipitor, iron sulfate, Elavil, Zantac, atenolol, Aggrenox, Zetia, Spiriva, Advair, albuterol. REVIEW OF SYSTEMS:  CONSTITUTIONAL:  No fever or chills. HEENT:  No headache, no blurred vision. CARDIOVASCULAR:  No chest pain or palpitations. RESPIRATORY:  Positive for cough, wheezing and shortness of breath. GASTROINTESTINAL:  No nausea or vomiting. GENITOURINARY:  No dysuria. MUSCULOSKELETAL:  No myalgias or arthralgias. SKIN:  No rash, no itching. NEUROLOGIC:  No focal neurological symptoms reported. PHYSICAL EXAMINATION:  VITAL SIGNS:  Blood pressure 130/70, pulse 100, respiratory rate 21, O2 sat 97%. GENERAL:  No apparent distress. HEENT:  PERRLA, EOMI.   NECK:  Midline trachea, no bruits. HEART:  S1, S2 regular rate and rhythm. LUNGS:  Prolonged expiratory phase. Decreased air movement. Bilateral wheezing noted. ABDOMEN:  Soft, nontender, nondistended. EXTREMITIES:  No edema. LABORATORY RESULTS:  White blood cell count 14.2, hemoglobin 15.1, platelets 125. Sodium 147, potassium 3.4, magnesium 1.6, albumin 3.5. Lactic acid 2.1. Chest x-ray, no acute cardiopulmonary process. ASSESSMENT:  This is a 82-year-old female with known COPD who is coming in with a COPD exacerbation and recent bout of bronchitis. She has dehydration. There is a very mild miniscule elevation in lactic acid that is secondary to dehydration. 1.  COPD exacerbation. 2.  Dehydration. 3.  Bronchitis. 4.  Lactic acid elevation secondary to dehydration. 5.  GERD. 6.  Dyslipidemia. 7.  History of anemia. 8.  Essential hypertension. 9.  History of stroke. PLAN:  1. Admit. 2.  Bronchodilators, systemic steroids, and oxygen to maintain SaO2 at 92% or greater. 3.  Continue outpatient medications. 4.  Monitor blood sugars. 5.  Wean oxygen as tolerated. 6.  DVT prophylaxis. 7.  Disposition dependent upon response to therapy. 8.  CODE STATUS:  FULL CODE. MD GAVINO Zazueta/RACHEL  D: 12/27/2017 16:21     T: 12/27/2017 16:42  JOB #: [id number]  CC:  Negrito Arredondo

## 2017-12-27 NOTE — ED NOTES
TRANSFER - OUT REPORT:    Verbal report given to Khoa Ramos RN on Stephane Jesus  being transferred to [facility](tele) for routine progression of care       Report consisted of patients Situation, Background, Assessment and   Recommendations(SBAR). Information from the following report(s) SBAR, ED Summary and Recent Results was reviewed with the receiving nurse. Lines:   Peripheral IV 12/27/17 Right; Inner (Active)   Dressing Status Clean, dry, & intact 12/27/2017  1:23 PM   Dressing Type Transparent 12/27/2017  1:23 PM   Hub Color/Line Status Pink;Flushed 12/27/2017  1:23 PM   Action Taken Blood drawn 12/27/2017  1:23 PM        Opportunity for questions and clarification was provided.       Patient transported with:   Monitor  Tech

## 2017-12-27 NOTE — ED NOTES
Assumed care of patient. Patient presents complaining of shortness of breath x1 week. Patient reports symptoms have been accompanied by cough, congestion, and fever with a TMax of 102.8 at home on Saturday. Patient reports she was seen at [facility] on Friday and given prescriptions for Prednisone and Doxycycline but has not had any improvement. Patient denies any chest pain. Patient denies any other symptoms. No respiratory distress noted at this time. Diminished lung sounds with expiratory wheezing heard upon auscultation.

## 2017-12-27 NOTE — ROUTINE PROCESS
TRANSFER - IN REPORT:    Verbal report received from Clayton Beckett RN(name) on Connie Basket  being received from ED(unit) for routine progression of care      Report consisted of patients Situation, Background, Assessment and   Recommendations(SBAR). Information from the following report(s) SBAR, Kardex, ED Summary, Intake/Output, MAR and Recent Results was reviewed with the receiving nurse. Opportunity for questions and clarification was provided. Assessment completed upon patients arrival to unit and care assumed.

## 2017-12-28 LAB
ANION GAP SERPL CALC-SCNC: 9 MMOL/L (ref 3–18)
BUN SERPL-MCNC: 12 MG/DL (ref 7–18)
BUN/CREAT SERPL: 15 (ref 12–20)
CALCIUM SERPL-MCNC: 8.9 MG/DL (ref 8.5–10.1)
CHLORIDE SERPL-SCNC: 105 MMOL/L (ref 100–108)
CO2 SERPL-SCNC: 27 MMOL/L (ref 21–32)
CREAT SERPL-MCNC: 0.82 MG/DL (ref 0.6–1.3)
ERYTHROCYTE [DISTWIDTH] IN BLOOD BY AUTOMATED COUNT: 13.4 % (ref 11.6–14.5)
GLUCOSE BLD STRIP.AUTO-MCNC: 115 MG/DL (ref 70–110)
GLUCOSE BLD STRIP.AUTO-MCNC: 125 MG/DL (ref 70–110)
GLUCOSE BLD STRIP.AUTO-MCNC: 132 MG/DL (ref 70–110)
GLUCOSE SERPL-MCNC: 152 MG/DL (ref 74–99)
HCT VFR BLD AUTO: 40.7 % (ref 35–45)
HGB BLD-MCNC: 13.4 G/DL (ref 12–16)
LACTATE SERPL-SCNC: 1.6 MMOL/L (ref 0.4–2)
MCH RBC QN AUTO: 30.3 PG (ref 24–34)
MCHC RBC AUTO-ENTMCNC: 32.9 G/DL (ref 31–37)
MCV RBC AUTO: 92.1 FL (ref 74–97)
PLATELET # BLD AUTO: 248 K/UL (ref 135–420)
PMV BLD AUTO: 9.7 FL (ref 9.2–11.8)
POTASSIUM SERPL-SCNC: 3.6 MMOL/L (ref 3.5–5.5)
RBC # BLD AUTO: 4.42 M/UL (ref 4.2–5.3)
SODIUM SERPL-SCNC: 141 MMOL/L (ref 136–145)
WBC # BLD AUTO: 9.5 K/UL (ref 4.6–13.2)

## 2017-12-28 PROCEDURE — 85027 COMPLETE CBC AUTOMATED: CPT | Performed by: INTERNAL MEDICINE

## 2017-12-28 PROCEDURE — 74011250637 HC RX REV CODE- 250/637: Performed by: HOSPITALIST

## 2017-12-28 PROCEDURE — 65660000000 HC RM CCU STEPDOWN

## 2017-12-28 PROCEDURE — 94640 AIRWAY INHALATION TREATMENT: CPT

## 2017-12-28 PROCEDURE — 77010033678 HC OXYGEN DAILY

## 2017-12-28 PROCEDURE — 74011250637 HC RX REV CODE- 250/637: Performed by: INTERNAL MEDICINE

## 2017-12-28 PROCEDURE — 94760 N-INVAS EAR/PLS OXIMETRY 1: CPT

## 2017-12-28 PROCEDURE — 83605 ASSAY OF LACTIC ACID: CPT | Performed by: INTERNAL MEDICINE

## 2017-12-28 PROCEDURE — 74011250636 HC RX REV CODE- 250/636: Performed by: INTERNAL MEDICINE

## 2017-12-28 PROCEDURE — 80048 BASIC METABOLIC PNL TOTAL CA: CPT | Performed by: INTERNAL MEDICINE

## 2017-12-28 PROCEDURE — 74011000250 HC RX REV CODE- 250: Performed by: INTERNAL MEDICINE

## 2017-12-28 PROCEDURE — 36415 COLL VENOUS BLD VENIPUNCTURE: CPT | Performed by: INTERNAL MEDICINE

## 2017-12-28 PROCEDURE — 82962 GLUCOSE BLOOD TEST: CPT

## 2017-12-28 RX ORDER — PANTOPRAZOLE SODIUM 40 MG/1
40 TABLET, DELAYED RELEASE ORAL 2 TIMES DAILY
Status: DISCONTINUED | OUTPATIENT
Start: 2017-12-28 | End: 2018-01-02 | Stop reason: HOSPADM

## 2017-12-28 RX ORDER — GUAIFENESIN 600 MG/1
600 TABLET, EXTENDED RELEASE ORAL EVERY 12 HOURS
Status: DISCONTINUED | OUTPATIENT
Start: 2017-12-28 | End: 2018-01-02 | Stop reason: HOSPADM

## 2017-12-28 RX ORDER — PANTOPRAZOLE SODIUM 40 MG/1
40 TABLET, DELAYED RELEASE ORAL 2 TIMES DAILY
Status: DISCONTINUED | OUTPATIENT
Start: 2017-12-28 | End: 2017-12-28

## 2017-12-28 RX ORDER — ASPIRIN AND DIPYRIDAMOLE 25; 200 MG/1; MG/1
1 CAPSULE, EXTENDED RELEASE ORAL 2 TIMES DAILY
Status: DISCONTINUED | OUTPATIENT
Start: 2017-12-28 | End: 2018-01-02 | Stop reason: HOSPADM

## 2017-12-28 RX ADMIN — METHYLPREDNISOLONE SODIUM SUCCINATE 40 MG: 40 INJECTION, POWDER, FOR SOLUTION INTRAMUSCULAR; INTRAVENOUS at 12:54

## 2017-12-28 RX ADMIN — ASPIRIN AND DIPYRIDAMOLE 1 CAPSULE: 25; 200 CAPSULE, EXTENDED RELEASE ORAL at 12:54

## 2017-12-28 RX ADMIN — IPRATROPIUM BROMIDE AND ALBUTEROL SULFATE 3 ML: .5; 3 SOLUTION RESPIRATORY (INHALATION) at 19:50

## 2017-12-28 RX ADMIN — IPRATROPIUM BROMIDE AND ALBUTEROL SULFATE 3 ML: .5; 3 SOLUTION RESPIRATORY (INHALATION) at 11:17

## 2017-12-28 RX ADMIN — DOXYCYCLINE 100 MG: 100 CAPSULE ORAL at 09:39

## 2017-12-28 RX ADMIN — ASPIRIN AND DIPYRIDAMOLE 1 CAPSULE: 25; 200 CAPSULE, EXTENDED RELEASE ORAL at 20:43

## 2017-12-28 RX ADMIN — GUAIFENESIN 600 MG: 600 TABLET, EXTENDED RELEASE ORAL at 20:43

## 2017-12-28 RX ADMIN — DOXYCYCLINE 100 MG: 100 CAPSULE ORAL at 20:43

## 2017-12-28 RX ADMIN — ALBUTEROL SULFATE 2.5 MG: 2.5 SOLUTION RESPIRATORY (INHALATION) at 00:16

## 2017-12-28 RX ADMIN — GUAIFENESIN 600 MG: 600 TABLET, EXTENDED RELEASE ORAL at 12:54

## 2017-12-28 RX ADMIN — HEPARIN SODIUM 5000 UNITS: 5000 INJECTION, SOLUTION INTRAVENOUS; SUBCUTANEOUS at 00:01

## 2017-12-28 RX ADMIN — SODIUM CHLORIDE 125 ML/HR: 900 INJECTION, SOLUTION INTRAVENOUS at 14:48

## 2017-12-28 RX ADMIN — SODIUM CHLORIDE 125 ML/HR: 900 INJECTION, SOLUTION INTRAVENOUS at 06:07

## 2017-12-28 RX ADMIN — PANTOPRAZOLE SODIUM 40 MG: 40 TABLET, DELAYED RELEASE ORAL at 18:29

## 2017-12-28 RX ADMIN — HEPARIN SODIUM 5000 UNITS: 5000 INJECTION, SOLUTION INTRAVENOUS; SUBCUTANEOUS at 09:39

## 2017-12-28 RX ADMIN — IPRATROPIUM BROMIDE AND ALBUTEROL SULFATE 3 ML: .5; 3 SOLUTION RESPIRATORY (INHALATION) at 07:00

## 2017-12-28 RX ADMIN — METHYLPREDNISOLONE SODIUM SUCCINATE 40 MG: 40 INJECTION, POWDER, FOR SOLUTION INTRAMUSCULAR; INTRAVENOUS at 18:29

## 2017-12-28 RX ADMIN — METHYLPREDNISOLONE SODIUM SUCCINATE 40 MG: 40 INJECTION, POWDER, FOR SOLUTION INTRAMUSCULAR; INTRAVENOUS at 06:06

## 2017-12-28 RX ADMIN — HEPARIN SODIUM 5000 UNITS: 5000 INJECTION, SOLUTION INTRAVENOUS; SUBCUTANEOUS at 18:29

## 2017-12-28 RX ADMIN — IPRATROPIUM BROMIDE AND ALBUTEROL SULFATE 3 ML: .5; 3 SOLUTION RESPIRATORY (INHALATION) at 15:43

## 2017-12-28 NOTE — DIABETES MGMT
Diabetes Patient/Family Education Record    Factors That  May Influence Patients Ability  to Learn or  Comply with Recommendations   []   Language barrier    []   Cultural needs   []   Motivation    []   Cognitive limitation    []   Physical   []   Education    []   Physiological factors   []   Hearing/vision/speaking impairment   []   Voodoo beliefs    []   Financial factors   []  Other:   [x]  No factors identified at this time.      Person Instructed:    [x]   Patient   [x]   Family (family)   []  Other     Preference for Learning:   [x]   Verbal   []   Written   []  Demonstration     Level of Comprehension & Competence:    [x]  Good                                      [] Fair                                     []  Poor                             []  Needs Reinforcement   [x]  Teachback completed    Education Component: pt meets criteria for pre diabetes; provided with prediabetes fact sheet and encouraged to follow up with OP PCM within 30 days of discharge; continue to have HbA1C monitored yearly   []  Medication management, including how to administer insulin (if appropriate) and potential medication interactions    []  Nutritional management    []  Exercise   []  Signs, symptoms, and treatment of hyperglycemia and hypoglycemia   [] Prevention, recognition and treatment of hyperglycemia and hypoglycemia   []  Importance of blood glucose monitoring and how to obtain a blood glucose meter    []  Instruction on use of the blood glucose meter   [x]  Discuss the importance of HbA1C monitoring; yearly   []  Sick day guidelines   []  Proper use and disposal of lancets, needles, syringes or insulin pens (if appropriate)   []  Potential long-term complications (retinopathy, kidney disease, neuropathy, foot care)   [] Information about whom to contact in case of emergency or for more information    [x]  Goal:  Patient/family will demonstrate understanding of Diabetes Self Management Skills by: 12/28/17  Plan for post-discharge education or self-management support:    [] Outpatient class schedule provided            [] Patient Declined    [] Scheduled for outpatient classes (date) _______         Caron Agudelo RN, MS  Glycemic Control Team

## 2017-12-28 NOTE — PROGRESS NOTES
Bedside shift change report given to Shanna Patino (oncoming nurse) by DAMION Landis  (offgoing nurse). Report included the following information SBAR and Kardex.

## 2017-12-28 NOTE — CDMP QUERY
Please clarify if this patient is being treated/managed for:    => Lactic acidosis in the setting of dehydration and COPD exacerbation   =>Other Explanation of clinical findings  =>Unable to Determine (no explanation of clinical findings)    The medical record reflects the following:    Risk: COPD    Clinical Indicators: 71yo female presented w/ cough, SOB. Lactic acid noted to be elevated 2.1, 2.6. Per the H&P,  \"Lactic acid elevation secondary to dehydration\"    Treatment: IV NS, O2, IV solumedrol, duonebs    Please clarify and document your clinical opinion in the progress notes and discharge summary including the definitive and/or presumptive diagnosis, (suspected or probable), related to the above clinical findings. Please include clinical findings supporting your diagnosis. If you DECLINE this query or would like to communicate with [facility], please utilize the \"[facility] message box\" at the TOP of the Progress Note on the right.       Thank you,  Hugh Lazaro [address] [place] [address], [address] [place] [address] Ne

## 2017-12-28 NOTE — PROGRESS NOTES
Hospitalist Progress Note    Patient: Danny Pino MRN: [id number]  CSN: [id number]    YOB: 1945  Age: 81 y.o. Sex: female    DOA: 12/27/2017 LOS:  LOS: 1 day                Assessment/Plan     Patient Active Problem List   Diagnosis Code    Degeneration of cervical intervertebral disc M50.30    Cervicalgia M54.2    Cervical spondylosis without myelopathy M47.812    Pain in limb M79.609    Pain in joint, shoulder region M25.519    Encounter for long-term (current) use of other medications Z79.899    DDD (degenerative disc disease), cervical M50.30    COPD exacerbation ([address] J44.1    Bronchitis J40    Dehydration E86.0    HTN (hypertension) I10            80 yo female admitted for COPd excerbation    COPD exacerbation - continue with steroids and inhaled bronchodilators,   Empiric antibiotics  mucinex  Wean off oxygen    On SSI while on steroids    History of CVA - continue on aggrenox    DVt prophylaxis with heparin    Disposition : 1-2 days    Review of systems  General: No fevers or chills. Cardiovascular: No chest pain or pressure. No palpitations. Pulmonary: No shortness of breath. Gastrointestinal: No nausea, vomiting. Physical Exam:  General: Awake, cooperative, no acute distress    HEENT: NC, Atraumatic. PERRLA, anicteric sclerae. Lungs: Expiratory wheezes bilaterally. Heart:  Regular  rhythm,  No murmur, No Rubs, No Gallops  Abdomen: Soft, Non distended, Non tender.  +Bowel sounds,   Extremities: No c/c/e  Psych:   Not anxious or agitated. Neurologic:  No acute neurological deficit.            Vital signs/Intake and Output:  Visit Vitals    /64 (BP 1 Location: Left arm, BP Patient Position: At rest)    Pulse (!) 103    Temp 97.9 °F (36.6 °C)    Resp 20    Ht 5' 8\" (1.727 m)    Wt 80.6 kg (177 lb 11.1 oz)    SpO2 95%    BMI 27.02 kg/m2     Current Shift:  12/28 0701 - 12/28 1900  In: 330 [P.O.:330]  Out: 2350 [Urine:2350]  Last three shifts: 12/26 1901 - 12/28 0700  In: 1570.8 [P.O.:600; I.V.:970.8]  Out: 1350 [Urine:1350]            Labs: Results:       Chemistry Recent Labs      12/28/17   0323  12/27/17   1323   GLU  152*  131*   NA  141  147*   K  3.6  3.4*   CL  105  107   CO2  27  28   BUN  12  13   CREA  0.82  0.94   CA  8.9  9.8   AGAP  9  12   BUCR  15  14   AP   --   112   TP   --   7.2   ALB   --   3.5   GLOB   --   3.7   AGRAT   --   0.9      CBC w/Diff Recent Labs      12/28/17   0323  12/27/17   1323   WBC  9.5  14.2*   RBC  4.42  4.90   HGB  13.4  15.1   HCT  40.7  45.0   PLT  248  280   GRANS   --   88*   LYMPH   --   8*   EOS   --   0      Cardiac Enzymes Recent Labs      12/27/17   1323   CPK  60   CKND1  CALCULATION NOT PERFORMED WHEN RESULT IS BELOW LINEAR LIMIT      Coagulation Recent Labs      12/27/17   1323   PTP  12.0   INR  0.9       Lipid Panel No results found for: CHOL, CHOLPOCT, CHOLX, CHLST, CHOLV, 525948, HDL, LDL, LDLC, DLDLP, 262191, VLDLC, VLDL, TGLX, TRIGL, TRIGP, TGLPOCT, CHHD, CHHDX   BNP No results for input(s): BNPP in the last 72 hours.    Liver Enzymes Recent Labs      12/27/17   1323   TP  7.2   ALB  3.5   AP  112   SGOT  17      Thyroid Studies No results found for: T4, T3U, TSH, TSHEXT     Procedures/imaging: see electronic medical records for all procedures/Xrays and details which were not copied into this note but were reviewed prior to creation of Plan

## 2017-12-28 NOTE — DIABETES MGMT
GLYCEMIC CONTROL PROGRESS NOTE:    -discussed in rounds, HbA1C 5.7%  -IV steroids on board  -BG out of target range non-ICU: < 140 mg/dL, requiring minimum corrective coverage  -TDD = 2 units of Humalog Normal Insulin Sensitivity Corrective Coverage  -monitor BG trends and make adjustments as needed    Recent Glucose Results:   Lab Results   Component Value Date/Time     (H) 12/28/2017 03:23 AM     (H) 12/27/2017 01:23 PM    GLUCPOC 125 (H) 12/28/2017 06:08 AM    GLUCPOC 191 (H) 12/27/2017 09:06 PM    GLUCPOC 146 (H) 12/27/2017 04:59 PM             Caron Agudelo RN, MS  Glycemic Control Team

## 2017-12-28 NOTE — PROGRESS NOTES
12/27/17 1931   Critical Result Types   Type of Critical Result Laboratory   Critical Lab Result Types   Critical Lab Value Lactic Acid   Lactic Acid Value 2.6   Notification Information   Notified By (Name) [facility]   Time of Critical Result Notification 1931   Verbal Readback Provided Yes   Provider Notification   Was Provider Notified Yes   Name of Provider Dr. Robin Mi    Provider [address] Yes   Time Provider Notified 2000   Date Provider Notified 12/27/17   Were Orders Received Yes  (Start NS @ 125ml/hr after liter bag is finished )

## 2017-12-28 NOTE — PROGRESS NOTES
Chart reviewed. Pt admitted to hospital for COPD exacerbation. Pt has PMH of COPD, GERD, dyslipidemia, anemia, stroke, osteoarthritis. CM will be available for discharge planning, as needed. 2752  Met with patient at bedside. Pt states she lives with her . Pt states she drives to appts. Pt states she has neb machine. Denies using cane, walker to ambulate. Pt denies currently using any HH services. No needs identified at this time. Pt noted to be ambulatory in room. Pt noted to be on 02 by NC during assessment. Please conduct walk test if pt may need oxygen at discharge. CM will cont to follow. Discharge Reassessment Plan:  Low Risk    RRAT Score:  1 - 12     Low Risk Care Transition Interventions:  1. Discharge transition plan:  Discharge home with physician follow up   2. Involved patient/caregiver in assessment, planning, education and implement of intervention. 3. CM daily patient care huddles/interdisciplinary rounds were completed. 4. PCP/Specialist appointment within 5 days made prior to discharge. Date/Time  5. Facilitated transportation and logistics for follow-up appointments. 6. Handoff to [facility] Nurse Navigator or PCP practice. Care Management Interventions  PCP Verified by CM: Yes  Mode of Transport at Discharge: Other (see comment) (family)  Transition of Care Consult (CM Consult): Discharge Planning  Health Maintenance Reviewed: Yes  Current Support Network: Lives with Spouse  Confirm Follow Up Transport: Self  Plan discussed with Pt/Family/Caregiver: Yes  Freedom of Choice Offered: Yes  Discharge Location  Discharge Placement: Home with family assistance    Care Management Interventions  PCP Verified by CM: Yes  Transition of Care Consult (CM Consult): Discharge Planning  Health Maintenance Reviewed:  Yes

## 2017-12-28 NOTE — ROUTINE PROCESS
1530 Bedside and Verbal shift change report given to BRITANY Dalton RN (oncoming nurse) by TONIO Vasques (offgoing nurse). Report included the following information SBAR, Kardex, Intake/Output and MAR.

## 2017-12-28 NOTE — ROUTINE PROCESS
Bedside and Verbal shift change report given to Kashif Sanders RN (oncoming nurse) by Jaswinder Barros RN (offgoing nurse). Report included the following information SBAR, Kardex, Intake/Output, MAR and Recent Results.

## 2017-12-28 NOTE — ROUTINE PROCESS
Bedside and Verbal shift change report given to David Steward RN (oncoming nurse) by Neida Moura RN (offgoing nurse). Report included the following information SBAR and Kardex.

## 2017-12-28 NOTE — PROGRESS NOTES
2235- Assumed Pt care from [address], expiratory wheezing on the lungs, on 3 L of nasal cannula. Pt resting with no distress.    2355- Lactic acid 2.1, trending down,  Pt on fluids, will repeat lactic in 4 hours

## 2017-12-28 NOTE — PROGRESS NOTES
Checked in on pt at [facility] 3701, awake, needing assitance to restroom. S.O.B and wheezy with activity. HHN given. Post HHN remains wheezy.

## 2017-12-29 LAB
GLUCOSE BLD STRIP.AUTO-MCNC: 108 MG/DL (ref 70–110)
GLUCOSE BLD STRIP.AUTO-MCNC: 114 MG/DL (ref 70–110)
GLUCOSE BLD STRIP.AUTO-MCNC: 120 MG/DL (ref 70–110)
GLUCOSE BLD STRIP.AUTO-MCNC: 124 MG/DL (ref 70–110)

## 2017-12-29 PROCEDURE — 97161 PT EVAL LOW COMPLEX 20 MIN: CPT

## 2017-12-29 PROCEDURE — 74011250636 HC RX REV CODE- 250/636: Performed by: INTERNAL MEDICINE

## 2017-12-29 PROCEDURE — 74011636637 HC RX REV CODE- 636/637: Performed by: INTERNAL MEDICINE

## 2017-12-29 PROCEDURE — 77010033678 HC OXYGEN DAILY

## 2017-12-29 PROCEDURE — 94760 N-INVAS EAR/PLS OXIMETRY 1: CPT

## 2017-12-29 PROCEDURE — 94640 AIRWAY INHALATION TREATMENT: CPT

## 2017-12-29 PROCEDURE — 74011000250 HC RX REV CODE- 250: Performed by: INTERNAL MEDICINE

## 2017-12-29 PROCEDURE — 74011250637 HC RX REV CODE- 250/637: Performed by: HOSPITALIST

## 2017-12-29 PROCEDURE — 74011250637 HC RX REV CODE- 250/637: Performed by: INTERNAL MEDICINE

## 2017-12-29 PROCEDURE — 65660000000 HC RM CCU STEPDOWN

## 2017-12-29 PROCEDURE — 97116 GAIT TRAINING THERAPY: CPT

## 2017-12-29 PROCEDURE — 82962 GLUCOSE BLOOD TEST: CPT

## 2017-12-29 RX ORDER — PREDNISONE 20 MG/1
60 TABLET ORAL ONCE
Status: COMPLETED | OUTPATIENT
Start: 2017-12-29 | End: 2017-12-29

## 2017-12-29 RX ADMIN — IPRATROPIUM BROMIDE AND ALBUTEROL SULFATE 3 ML: .5; 3 SOLUTION RESPIRATORY (INHALATION) at 11:03

## 2017-12-29 RX ADMIN — ALBUTEROL SULFATE 2.5 MG: 2.5 SOLUTION RESPIRATORY (INHALATION) at 01:36

## 2017-12-29 RX ADMIN — SODIUM CHLORIDE 125 ML/HR: 900 INJECTION, SOLUTION INTRAVENOUS at 07:30

## 2017-12-29 RX ADMIN — HEPARIN SODIUM 5000 UNITS: 5000 INJECTION, SOLUTION INTRAVENOUS; SUBCUTANEOUS at 00:11

## 2017-12-29 RX ADMIN — ASPIRIN AND DIPYRIDAMOLE 1 CAPSULE: 25; 200 CAPSULE, EXTENDED RELEASE ORAL at 10:48

## 2017-12-29 RX ADMIN — SODIUM CHLORIDE 125 ML/HR: 900 INJECTION, SOLUTION INTRAVENOUS at 00:11

## 2017-12-29 RX ADMIN — METHYLPREDNISOLONE SODIUM SUCCINATE 40 MG: 40 INJECTION, POWDER, FOR SOLUTION INTRAMUSCULAR; INTRAVENOUS at 07:29

## 2017-12-29 RX ADMIN — METHYLPREDNISOLONE SODIUM SUCCINATE 40 MG: 40 INJECTION, POWDER, FOR SOLUTION INTRAMUSCULAR; INTRAVENOUS at 13:38

## 2017-12-29 RX ADMIN — DOXYCYCLINE 100 MG: 100 CAPSULE ORAL at 21:38

## 2017-12-29 RX ADMIN — PREDNISONE 60 MG: 20 TABLET ORAL at 21:38

## 2017-12-29 RX ADMIN — DOXYCYCLINE 100 MG: 100 CAPSULE ORAL at 10:52

## 2017-12-29 RX ADMIN — IPRATROPIUM BROMIDE AND ALBUTEROL SULFATE 3 ML: .5; 3 SOLUTION RESPIRATORY (INHALATION) at 21:17

## 2017-12-29 RX ADMIN — GUAIFENESIN 600 MG: 600 TABLET, EXTENDED RELEASE ORAL at 21:38

## 2017-12-29 RX ADMIN — HEPARIN SODIUM 5000 UNITS: 5000 INJECTION, SOLUTION INTRAVENOUS; SUBCUTANEOUS at 10:47

## 2017-12-29 RX ADMIN — ASPIRIN AND DIPYRIDAMOLE 1 CAPSULE: 25; 200 CAPSULE, EXTENDED RELEASE ORAL at 21:38

## 2017-12-29 RX ADMIN — PANTOPRAZOLE SODIUM 40 MG: 40 TABLET, DELAYED RELEASE ORAL at 21:38

## 2017-12-29 RX ADMIN — IPRATROPIUM BROMIDE AND ALBUTEROL SULFATE 3 ML: .5; 3 SOLUTION RESPIRATORY (INHALATION) at 15:05

## 2017-12-29 RX ADMIN — IPRATROPIUM BROMIDE AND ALBUTEROL SULFATE 3 ML: .5; 3 SOLUTION RESPIRATORY (INHALATION) at 07:01

## 2017-12-29 RX ADMIN — PANTOPRAZOLE SODIUM 40 MG: 40 TABLET, DELAYED RELEASE ORAL at 10:48

## 2017-12-29 RX ADMIN — METHYLPREDNISOLONE SODIUM SUCCINATE 40 MG: 40 INJECTION, POWDER, FOR SOLUTION INTRAMUSCULAR; INTRAVENOUS at 00:11

## 2017-12-29 RX ADMIN — GUAIFENESIN 600 MG: 600 TABLET, EXTENDED RELEASE ORAL at 10:48

## 2017-12-29 RX ADMIN — HEPARIN SODIUM 5000 UNITS: 5000 INJECTION, SOLUTION INTRAVENOUS; SUBCUTANEOUS at 18:07

## 2017-12-29 NOTE — PROGRESS NOTES
3532: Shift Summary: Vital signs remained stable; No C/o pain or discomfort; Cardiac rhythm; SR/ST; Scattered wheezing present on auscultation, Pt. Still ABRAMS, Duo Nebs/Solumedrol/Supplemental O2 administered overnight; Call bell left at reach; bed at lowest position; and wheels locked     Bedside shift change report given to  Shanta Ross (oncoming nurse) by DAMION hodges (offgoing nurse). Report included the following information SBAR and Kardex.

## 2017-12-29 NOTE — PROGRESS NOTES
[address] responsibility for patient from [place], [place]    1340 Administered medications

## 2017-12-29 NOTE — PROGRESS NOTES
Problem: Mobility Impaired (Adult and Pediatric)  Goal: *Acute Goals and Plan of Care (Insert Text)  Physical Therapy Goals  Initiated 12/29/2017 and to be accomplished within 3-7 day(s)  1. Patient will move from supine to sit and sit to supine  in bed with independence. 2.  Patient will transfer from bed to chair and chair to bed with independence using the least restrictive device. 3.  Patient will perform sit to stand with modified independence. 4.  Patient will ambulate with modified independence for 300 feet with the least restrictive device. 5.  Patient will ascend/descend 3 stairs with B handrail(s) with supervision/set-up. Outcome: Progressing Towards Goal  physical Therapy EVALUATION    Patient: Xena Avila (30 y.o. female)  Date: 12/29/2017  Primary Diagnosis: COPD exacerbation ([facility] [address]  Precautions:   Fall    ASSESSMENT :  Based on the objective data described below, the patient presents with lower extremity weakness, decreased gait quality and endurance, and overall limitations in functional mobility. Pt performed supine to sit with Vianca, sit to stand with Vianca. Patient ambulated 150 feet without AD, GB applied, supervision. On RA at rest, SPO2 92, with short distance (50-100ft) SPO2 decreased to 89 briefly and with cues for breathing technique increased to 92% Upon addition of O2 via NC at 2L, SPO2 slowly increased to 95%. Pt SOB during and following ambulation. Will continue to progress as pt tolerates and attempt stair training when appropriate. Pt tolerated session well as evidenced by no c/o dizziness or lightheadedness, SPO2 values as described above. Patient would benefit from skilled inpatient physical therapy to address deficits, progress as tolerated to achieve long term goals and allow safe discharge. Patient will benefit from skilled intervention to address the above impairments.   Patients rehabilitation potential is considered to be Good  Factors which may influence rehabilitation potential include:   []         None noted  []         Mental ability/status  [x]         Medical condition  []         Home/family situation and support systems  []         Safety awareness  []         Pain tolerance/management  []         Other:      PLAN :  Recommendations and Planned Interventions:  [x]           Bed Mobility Training             [x]    Neuromuscular Re-Education  [x]           Transfer Training                   []    Orthotic/Prosthetic Training  [x]           Gait Training                          []    Modalities  [x]           Therapeutic Exercises          []    Edema Management/Control  [x]           Therapeutic Activities            [x]    Patient and Family Training/Education  []           Other (comment):    Frequency/Duration: Patient will be followed by physical therapy 1-2 times per day to address goals. Discharge Recommendations: Home Health  Further Equipment Recommendations for Discharge: N/A     SUBJECTIVE:   Patient stated I am feeling better today.     OBJECTIVE DATA SUMMARY:     Past Medical History:   Diagnosis Date    COPD     GERD (gastroesophageal reflux disease)     well controlled with meds    HTN (hypertension) 12/27/2017    Hx-TIA (transient ischemic attack)     aggrenox    Migraine     amitriptyline    Stroke ([facility] [address] 2009    TIA     Past Surgical History:   Procedure Laterality Date    HX CARPAL TUNNEL RELEASE      HX CHOLECYSTECTOMY      HX CYST REMOVAL      HX HEENT      tonsils    HX HYSTERECTOMY      HX ORTHOPAEDIC      carpal tunnel right    HX ORTHOPAEDIC      left shoulder surgery    HX ALDO AND BSO      NEUROLOGICAL PROCEDURE UNLISTED      neck surgery     Barriers to Learning/Limitations: None  Compensate with: N/A  Prior Level of Function/Home Situation: Independent amb s/AD  Home Situation  Home Environment: Private residence  # Steps to Enter: 4  Rails to Enter: Yes  Hand Rails : Bilateral  Wheelchair Ramp: No  One/Two Story Residence: One story  Living Alone: No  Support Systems: Spouse/Significant Other/Partner, Family member(s)  Patient Expects to be Discharged to[de-identified] Private residence  Current DME Used/Available at Home: None  Strength:    Strength: Generally decreased, functional  Tone & Sensation:   Tone: Normal  Sensation: Intact  Range Of Motion:  AROM: Generally decreased, functional  Functional Mobility:  Bed Mobility:  Supine to Sit: Modified independent  Sit to Supine: Modified independent  Transfers:  Sit to Stand: Modified independent  Stand to Sit: Modified independent  Balance:   Sitting: Intact  Standing: Intact; Without support  Ambulation/Gait Training:  Distance (ft): 150 Feet (ft)  Assistive Device: Gait belt  Ambulation - Level of Assistance: Supervision  Gait Description (WDL): Exceptions to WDL  Gait Abnormalities: Decreased step clearance  Base of Support: Widened  Speed/Inez: Slow  Step Length: Left shortened;Right shortened  Pain:  Pain Scale 1: Numeric (0 - 10)  Pain Intensity 1: 0  Activity Tolerance:   Good  Please refer to the flowsheet for vital signs taken during this treatment. After treatment:   []         Patient left in no apparent distress sitting up in chair  [x]         Patient left in no apparent distress in bed  [x]         Call bell left within reach  [x]         Nursing notified  []         Caregiver present  []         Bed alarm activated    COMMUNICATION/EDUCATION:   [x]         Fall prevention education was provided and the patient/caregiver indicated understanding. [x]         Patient/family have participated as able in goal setting and plan of care. [x]         Patient/family agree to work toward stated goals and plan of care. []         Patient understands intent and goals of therapy, but is neutral about his/her participation. []         Patient is unable to participate in goal setting and plan of care.     Thank you for this referral.  Carola Kilgore   Time Calculation: 27 mins Eval Complexity: History: MEDIUM  Complexity : 1-2 comorbidities / personal factors will impact the outcome/ POC Exam:LOW Complexity : 1-2 Standardized tests and measures addressing body structure, function, activity limitation and / or participation in recreation  Presentation: LOW Complexity : Stable, uncomplicated  Clinical Decision Making:Low Complexity amb >30 ft s/AD Overall Complexity:LOW    Mobility  Current  CJ= 20-39%   Goal  CI= 1-19%. The severity rating is based on the Level of Assistance required for Functional Mobility and ADLs.

## 2017-12-29 NOTE — PROGRESS NOTES
Occupational Therapy Screening:  OT orders received. Chart reviewed. Pt received standing at bedside hugging a visitor when OT arrived to room. Pt is steady on her feet. Pt reports she has been ambulating to/from bathroom for toileting Independently and has no OT/ADL concerns at this time. Reviewed energy conservation techniques/strategies to implement during ADL completion prn and pt verbalized understanding. Pt has RW, cane, raised toilet seat and grab bars in bathroom at home. No further acute care OT needs at this time. Recommend home with spouse at d/c. Will sign off. Thank you.   Veronica Gill, OTR/L

## 2017-12-29 NOTE — ROUTINE PROCESS
Shift summary:  Pt remained stable. No acute changes. Pt is up without assistance as shortness of breath with exertion is improving. No complaints of pain.

## 2017-12-29 NOTE — PROGRESS NOTES
Attended IDRs. Met with patient at bedside. Per MD Jered Aguirre pt should be weaned off 02. Primary RN, Celeste Frederick aware pt will need documented walk test if she will require 02 at discharge. No needs identified at this time. CM will cont to follow for discharge planning needs. D/c plan anticipate home next 24-48 hours with  does not met the oxygen see pt notes. Pt recommends HHC will provide with a list. Pt offered [facility]. Pt chose [facility]. Referral will be placed with CMS.

## 2017-12-29 NOTE — DIABETES MGMT
GLYCEMIC CONTROL PROGRESS NOTE:    -discussed in round, HbA1C meets criteria for preDM  -IV steroids on board  -glycemic control progressing, no corrective coverage required last 48 hours  -monitor BG trends and make adjustments as needed    Recent Glucose Results:   Lab Results   Component Value Date/Time    GLUCPOC 114 (H) 12/29/2017 11:46 AM    GLUCPOC 120 (H) 12/29/2017 06:18 AM    GLUCPOC 132 (H) 12/28/2017 09:39 PM             Autumn Faria RN, MS  Glycemic Control Team

## 2017-12-29 NOTE — PROGRESS NOTES
Hospitalist Progress Note    Patient: Sae Swan MRN: [id number]  CSN: [id number]    YOB: 1945  Age: 81 y.o. Sex: female    DOA: 12/27/2017 LOS:  LOS: 2 days                Assessment/Plan     Patient Active Problem List   Diagnosis Code    Degeneration of cervical intervertebral disc M50.30    Cervicalgia M54.2    Cervical spondylosis without myelopathy M47.812    Pain in limb M79.609    Pain in joint, shoulder region M25.519    Encounter for long-term (current) use of other medications Z79.899    DDD (degenerative disc disease), cervical M50.30    COPD exacerbation ([address] J44.1    Bronchitis J40    Dehydration E86.0    HTN (hypertension) I10            80 yo female admitted for COPd excerbation    COPD exacerbation - continue with steroids and inhaled bronchodilators,   Empiric antibiotics  mucinex  Wean off oxygen  ambulate    On SSI while on steroids    History of CVA - continue on aggrenox    DVt prophylaxis with heparin    Disposition : 1-2 days    Review of systems  General: No fevers or chills. Cardiovascular: No chest pain or pressure. No palpitations. Pulmonary: No shortness of breath. Gastrointestinal: No nausea, vomiting. Physical Exam:  General: Awake, cooperative, no acute distress    HEENT: NC, Atraumatic. PERRLA, anicteric sclerae. Lungs: Expiratory wheezes bilaterally. Heart:  Regular  rhythm,  No murmur, No Rubs, No Gallops  Abdomen: Soft, Non distended, Non tender.  +Bowel sounds,   Extremities: No c/c/e  Psych:   Not anxious or agitated. Neurologic:  No acute neurological deficit.            Vital signs/Intake and Output:  Visit Vitals    /60 (BP 1 Location: Left arm, BP Patient Position: At rest)    Pulse (!) 104    Temp 97.5 °F (36.4 °C)    Resp 16    Ht 5' 8\" (1.727 m)    Wt 87.3 kg (192 lb 7.4 oz)    SpO2 94%    BMI 29.26 kg/m2     Current Shift:     Last three shifts:  12/27 1901 - 12/29 0700  In: 6417.9 [P.O.:1410; I.V.:5007.9]  Out: 6750 [Urine:6750]            Labs: Results:       Chemistry Recent Labs      12/28/17   0323  12/27/17   1323   GLU  152*  131*   NA  141  147*   K  3.6  3.4*   CL  105  107   CO2  27  28   BUN  12  13   CREA  0.82  0.94   CA  8.9  9.8   AGAP  9  12   BUCR  15  14   AP   --   112   TP   --   7.2   ALB   --   3.5   GLOB   --   3.7   AGRAT   --   0.9      CBC w/Diff Recent Labs      12/28/17   0323  12/27/17   1323   WBC  9.5  14.2*   RBC  4.42  4.90   HGB  13.4  15.1   HCT  40.7  45.0   PLT  248  280   GRANS   --   88*   LYMPH   --   8*   EOS   --   0      Cardiac Enzymes Recent Labs      12/27/17   1323   CPK  60   CKND1  CALCULATION NOT PERFORMED WHEN RESULT IS BELOW LINEAR LIMIT      Coagulation Recent Labs      12/27/17   1323   PTP  12.0   INR  0.9       Lipid Panel No results found for: CHOL, CHOLPOCT, CHOLX, CHLST, CHOLV, 519708, HDL, LDL, LDLC, DLDLP, 771658, VLDLC, VLDL, TGLX, TRIGL, TRIGP, TGLPOCT, CHHD, CHHDX   BNP No results for input(s): BNPP in the last 72 hours.    Liver Enzymes Recent Labs      12/27/17   1323   TP  7.2   ALB  3.5   AP  112   SGOT  17      Thyroid Studies No results found for: T4, T3U, TSH, TSHEXT, TSHEXT     Procedures/imaging: see electronic medical records for all procedures/Xrays and details which were not copied into this note but were reviewed prior to creation of Plan

## 2017-12-29 NOTE — ROUTINE PROCESS
Bedside and Verbal shift change report given to DAMION Mendes RN (oncoming nurse) by Naina Landrum. Maddie Paz RN (offgoing nurse). Report included the following information SBAR, Kardex, Intake/Output, MAR, Recent Results and Cardiac Rhythm SR/ST.

## 2017-12-30 LAB
GLUCOSE BLD STRIP.AUTO-MCNC: 111 MG/DL (ref 70–110)
GLUCOSE BLD STRIP.AUTO-MCNC: 117 MG/DL (ref 70–110)
GLUCOSE BLD STRIP.AUTO-MCNC: 126 MG/DL (ref 70–110)
GLUCOSE BLD STRIP.AUTO-MCNC: 133 MG/DL (ref 70–110)

## 2017-12-30 PROCEDURE — 94640 AIRWAY INHALATION TREATMENT: CPT

## 2017-12-30 PROCEDURE — 77010033678 HC OXYGEN DAILY

## 2017-12-30 PROCEDURE — 82962 GLUCOSE BLOOD TEST: CPT

## 2017-12-30 PROCEDURE — 74011250637 HC RX REV CODE- 250/637: Performed by: HOSPITALIST

## 2017-12-30 PROCEDURE — 74011000250 HC RX REV CODE- 250: Performed by: INTERNAL MEDICINE

## 2017-12-30 PROCEDURE — 94760 N-INVAS EAR/PLS OXIMETRY 1: CPT

## 2017-12-30 PROCEDURE — 97530 THERAPEUTIC ACTIVITIES: CPT

## 2017-12-30 PROCEDURE — 97116 GAIT TRAINING THERAPY: CPT

## 2017-12-30 PROCEDURE — 74011250636 HC RX REV CODE- 250/636: Performed by: INTERNAL MEDICINE

## 2017-12-30 PROCEDURE — 65660000000 HC RM CCU STEPDOWN

## 2017-12-30 PROCEDURE — 74011250637 HC RX REV CODE- 250/637: Performed by: INTERNAL MEDICINE

## 2017-12-30 RX ADMIN — PANTOPRAZOLE SODIUM 40 MG: 40 TABLET, DELAYED RELEASE ORAL at 21:43

## 2017-12-30 RX ADMIN — IPRATROPIUM BROMIDE AND ALBUTEROL SULFATE 3 ML: .5; 3 SOLUTION RESPIRATORY (INHALATION) at 11:03

## 2017-12-30 RX ADMIN — HEPARIN SODIUM 5000 UNITS: 5000 INJECTION, SOLUTION INTRAVENOUS; SUBCUTANEOUS at 01:22

## 2017-12-30 RX ADMIN — IPRATROPIUM BROMIDE AND ALBUTEROL SULFATE 3 ML: .5; 3 SOLUTION RESPIRATORY (INHALATION) at 15:34

## 2017-12-30 RX ADMIN — PANTOPRAZOLE SODIUM 40 MG: 40 TABLET, DELAYED RELEASE ORAL at 10:17

## 2017-12-30 RX ADMIN — ALBUTEROL SULFATE 2.5 MG: 2.5 SOLUTION RESPIRATORY (INHALATION) at 01:22

## 2017-12-30 RX ADMIN — METHYLPREDNISOLONE SODIUM SUCCINATE 40 MG: 40 INJECTION, POWDER, FOR SOLUTION INTRAMUSCULAR; INTRAVENOUS at 17:27

## 2017-12-30 RX ADMIN — METHYLPREDNISOLONE SODIUM SUCCINATE 40 MG: 40 INJECTION, POWDER, FOR SOLUTION INTRAMUSCULAR; INTRAVENOUS at 04:40

## 2017-12-30 RX ADMIN — GUAIFENESIN 600 MG: 600 TABLET, EXTENDED RELEASE ORAL at 10:17

## 2017-12-30 RX ADMIN — DOXYCYCLINE 100 MG: 100 CAPSULE ORAL at 21:45

## 2017-12-30 RX ADMIN — IPRATROPIUM BROMIDE AND ALBUTEROL SULFATE 3 ML: .5; 3 SOLUTION RESPIRATORY (INHALATION) at 20:12

## 2017-12-30 RX ADMIN — METHYLPREDNISOLONE SODIUM SUCCINATE 40 MG: 40 INJECTION, POWDER, FOR SOLUTION INTRAMUSCULAR; INTRAVENOUS at 10:16

## 2017-12-30 RX ADMIN — METHYLPREDNISOLONE SODIUM SUCCINATE 40 MG: 40 INJECTION, POWDER, FOR SOLUTION INTRAMUSCULAR; INTRAVENOUS at 21:43

## 2017-12-30 RX ADMIN — GUAIFENESIN 600 MG: 600 TABLET, EXTENDED RELEASE ORAL at 21:43

## 2017-12-30 RX ADMIN — SODIUM CHLORIDE 125 ML/HR: 900 INJECTION, SOLUTION INTRAVENOUS at 12:13

## 2017-12-30 RX ADMIN — ASPIRIN AND DIPYRIDAMOLE 1 CAPSULE: 25; 200 CAPSULE, EXTENDED RELEASE ORAL at 10:17

## 2017-12-30 RX ADMIN — HEPARIN SODIUM 5000 UNITS: 5000 INJECTION, SOLUTION INTRAVENOUS; SUBCUTANEOUS at 17:27

## 2017-12-30 RX ADMIN — ASPIRIN AND DIPYRIDAMOLE 1 CAPSULE: 25; 200 CAPSULE, EXTENDED RELEASE ORAL at 21:43

## 2017-12-30 RX ADMIN — HEPARIN SODIUM 5000 UNITS: 5000 INJECTION, SOLUTION INTRAVENOUS; SUBCUTANEOUS at 10:16

## 2017-12-30 RX ADMIN — DOXYCYCLINE 100 MG: 100 CAPSULE ORAL at 10:23

## 2017-12-30 RX ADMIN — IPRATROPIUM BROMIDE AND ALBUTEROL SULFATE 3 ML: .5; 3 SOLUTION RESPIRATORY (INHALATION) at 07:27

## 2017-12-30 NOTE — PROGRESS NOTES
Problem: Mobility Impaired (Adult and Pediatric)  Goal: *Acute Goals and Plan of Care (Insert Text)  Physical Therapy Goals  Initiated 12/29/2017 and to be accomplished within 3-7 day(s)  1. Patient will move from supine to sit and sit to supine  in bed with independence. 2.  Patient will transfer from bed to chair and chair to bed with independence using the least restrictive device. 3.  Patient will perform sit to stand with modified independence. 4.  Patient will ambulate with modified independence for 300 feet with the least restrictive device. 5.  Patient will ascend/descend 3 stairs with B handrail(s) with supervision/set-up. Attempted to see pt for PT session, 819, will return shortly, as pt schedule allows.

## 2017-12-30 NOTE — PROGRESS NOTES
Problem: Falls - Risk of  Goal: *Absence of Falls  Document Jael Fall Risk and appropriate interventions in the flowsheet.    Outcome: Progressing Towards Goal  Fall Risk Interventions:            Medication Interventions: Patient to call before getting OOB         History of Falls Interventions: Door open when patient unattended

## 2017-12-30 NOTE — PROGRESS NOTES
Bedside shift change report given to Lidia Mosquera RN (oncoming nurse) by Shanthi Taylor (offgoing nurse). Report included the following information SBAR, Kardex and MAR.

## 2017-12-30 NOTE — PROGRESS NOTES
Problem: Mobility Impaired (Adult and Pediatric)  Goal: *Acute Goals and Plan of Care (Insert Text)  Physical Therapy Goals  Initiated 12/29/2017 and to be accomplished within 3-7 day(s)  1. Patient will move from supine to sit and sit to supine  in bed with independence. 2.  Patient will transfer from bed to chair and chair to bed with independence using the least restrictive device. 3.  Patient will perform sit to stand with modified independence. 4.  Patient will ambulate with modified independence for 300 feet with the least restrictive device. 5.  Patient will ascend/descend 3 stairs with B handrail(s) with supervision/set-up. Outcome: Progressing Towards Goal  physical Therapy TREATMENT    Patient: Antionette Mendez (19 y.o. female)  Date: 12/30/2017  Diagnosis: COPD exacerbation (HCC) COPD exacerbation ([facility] [address]  Precautions: Fall   Chart, physical therapy assessment, plan of care and goals were reviewed. ASSESSMENT:  Pt participated in PT session on RA. Spoke with RN prior/following session regarding O2. Pt 92% at rest. With ambulation SPO2 decreased to 90%, with frequent standing rest pt SPO2 remained from 90-92%. HR fluctuated from 130s to 140 during session. Frequent standing rest breaks required during the 200 ft amb. Discussed energy conservation, especially right after hospital d/c. Pt left sitting in bed with all needs met and call bell within reach. Progression toward goals:  [x]      Improving appropriately and progressing toward goals  []      Improving slowly and progressing toward goals  []      Not making progress toward goals and plan of care will be adjusted     PLAN:  Patient continues to benefit from skilled intervention to address the above impairments. Continue treatment per established plan of care. Discharge Recommendations:  Home Health  Further Equipment Recommendations for Discharge:  N/A     SUBJECTIVE:   Patient stated I am feeling pretty good today.     OBJECTIVE DATA SUMMARY:   Functional Mobility Training:  Bed Mobility:  Supine to Sit: Modified independent  Sit to Supine: Modified independent  Transfers:  Sit to Stand: Modified independent  Stand to Sit: Modified independent  Balance:  Sitting: Intact  Standing: Intact; Without support  Ambulation/Gait Training:  Distance (ft): 200 Feet (ft)  Assistive Device: Gait belt  Ambulation - Level of Assistance: Supervision  Gait Abnormalities: Decreased step clearance  Speed/Inez: Slow  Step Length: Left shortened;Right shortened  Pain:  Pain Scale 1: Numeric (0 - 10)  Pain Intensity 1: 0  Activity Tolerance:   Good  Please refer to the flowsheet for vital signs taken during this treatment.   After treatment:   [] Patient left in no apparent distress sitting up in chair  [x] Patient left in no apparent distress in bed  [x] Call bell left within reach  [x] Nursing notified  [] Caregiver present  [] Bed alarm activated      Elijah Kilgore   Time Calculation: 23 mins

## 2017-12-30 NOTE — PROGRESS NOTES
2106 Pt's IV was gone at change of shift. Three RNs attempted to restart the IV without luck. Pt tolerated it very well. IV solumedrol due. Spoke to Dr Malena Haddad and notified him that the IV was lost and received an order for 60mg PO prednisone x1 now. Called ED to ask if a tech could come start the IV. They are busy currently but will try later. 2300 Requested that an ICU nurse try to start the IV but they are unavailable. 0040 ED tech on the unit for another reason. Requested that he try the IV but he needed to assist another patient. [id number] Shift Summary: Pt rested well with no complaints. No new clinical concerns noted except as noted above r/t the IV.

## 2017-12-30 NOTE — ROUTINE PROCESS
Bedside and Verbal shift change report given to eJff Patel RN  (oncoming nurse) by Sidney Patino RN  (offgoing nurse). Report given with SBAR, Kardex, Intake/Output and Recent Results.

## 2017-12-30 NOTE — PROGRESS NOTES
Hospitalist Progress Note    Patient: Mary Jo Mcdonald MRN: [id number]  CSN: [id number]    YOB: 1945  Age: 81 y.o. Sex: female    DOA: 12/27/2017 LOS:  LOS: 3 days          Chief Complaint: copd          Assessment/Plan       Disposition :  Patient Active Problem List   Diagnosis Code    Degeneration of cervical intervertebral disc M50.30    Cervicalgia M54.2    Cervical spondylosis without myelopathy M47.812    Pain in limb M79.609    Pain in joint, shoulder region M25.519    Encounter for long-term (current) use of other medications Z79.899    DDD (degenerative disc disease), cervical M50.30    COPD exacerbation ([facility] [address] J44.1    Bronchitis J40    Dehydration E86.0    HTN (hypertension) I10       1. COPD exacerbation. 2.  Dehydration. 3.  Bronchitis. 4.  Lactic acid elevation secondary to dehydration. 5.  GERD. 6.  Dyslipidemia. 7.  History of anemia. 8.  Essential hypertension. 9.  History of stroke. 10. Lactic acidosis in the setting of copd poa. Still needs inpatient care. Continue care. Continue nebs/steroids. Subjective:    Dyspnea. Review of systems:    Constitutional: denies fevers, chills, myalgias  Respiratory: denies SOB, cough  Cardiovascular: denies chest pain, palpitations  Gastrointestinal: denies nausea, vomiting, diarrhea      Vital signs/Intake and Output:  Visit Vitals    /69 (BP 1 Location: Left arm)    Pulse 98    Temp 97.1 °F (36.2 °C)    Resp 12    Ht 5' 8\" (1.727 m)    Wt 87.3 kg (192 lb 7.4 oz)    SpO2 94%    BMI 29.26 kg/m2     Current Shift:  12/30 0701 - 12/30 1900  In: 1760 [P.O.:1760]  Out: 600 [Urine:600]  Last three shifts:  12/28 1901 - 12/30 0700  In: 3757.1 [P.O.:720; I.V.:3037.1]  Out: 3650 [Urine:3650]    Exam:    General: Well developed, alert, NAD, OX3  Head/Neck: mmm  CVS:Regular rate and rhythm, no M/R/G, S1/S2 heard, no thrill  Lungs:Prolonged expiratory phase with reduction in air movement / volume. Exp wheezing. Abdomen: Soft, Nontender, No distention, Normal Bowel sounds, No hepatomegaly  Extremities: No C/C/E, pulses palpable 2+                  Labs: Results:       Chemistry Recent Labs      12/28/17   0323   GLU  152*   NA  141   K  3.6   CL  105   CO2  27   BUN  12   CREA  0.82   CA  8.9   AGAP  9   BUCR  15      CBC w/Diff Recent Labs      12/28/17   0323   WBC  9.5   RBC  4.42   HGB  13.4   HCT  40.7   PLT  248      Cardiac Enzymes No results for input(s): CPK, CKND1, ESTEFANY in the last 72 hours. No lab exists for component: CKRMB, TROIP   Coagulation No results for input(s): PTP, INR, APTT in the last 72 hours. No lab exists for component: INREXT    Lipid Panel No results found for: CHOL, CHOLPOCT, CHOLX, CHLST, CHOLV, 422480, HDL, LDL, LDLC, DLDLP, 773749, VLDLC, VLDL, TGLX, TRIGL, TRIGP, TGLPOCT, CHHD, CHHDX   BNP No results for input(s): BNPP in the last 72 hours. Liver Enzymes No results for input(s): TP, ALB, TBIL, AP, SGOT, GPT in the last 72 hours.     No lab exists for component: DBIL   Thyroid Studies No results found for: T4, T3U, TSH, TSHEXT     Procedures/imaging: see electronic medical records for all procedures/Xrays and details which were not copied into this note but were reviewed prior to creation of Jered Santoro MD

## 2017-12-30 NOTE — PROGRESS NOTES
[address], [address] [place] [address], [facility] [address] [facility]

## 2017-12-30 NOTE — PROGRESS NOTES
0730 Assumed responsibility for patient from Barbara Robison RN    0900 Physical therapy in to see patient . Patient walking in hallways. 1017 Patient assessed and morning medications administered    1213 New bag of fluids hung. Patient visiting with relatives at bedside    1730 evening meds administered    Bedside shift change report given to Bethany Dickerson RN (oncoming nurse) by Gloria Santos RN (offgoing nurse). Report included the following information SBAR, Kardex and MAR. Paged Dr. Ernesto Kuhn as patient's face has become reddened. Possible reaction to steroids. Orders to give benedryl and call back if redness does not subside.

## 2017-12-30 NOTE — ROUTINE PROCESS
Bedside and Verbal shift change report given to Nida Blake (oncoming nurse) by Anthony Cardoza (offgoing nurse). Report included the following information SBAR, Kardex, MAR, Accordion, Recent Results, Med Rec Status and Cardiac Rhythm NSR w 6 sec run of PSVT.

## 2017-12-30 NOTE — PROGRESS NOTES
0310- able to finally place IVC, restarted NS @125mls/hr. Patient had an uneventful night  with no signs of distress noted. Patient's vital signs were stable and the M.E.W.s score was a 1-2 the entire shift. The white board was updated and explained to the patient. The call bell, water and personal belongings were within reach. Patient had no complaints of nausea, vomiting or pain this shift. The five \"p's\" were addressed during caring rounds every two hours. Patient had no questions, comments or concerns after the bedside shift report. Pt is on 2L/min nasal cannula oxygen, slight ABRAMS. Pt had a 6 second run of PSVT, returned to NSR.

## 2017-12-31 LAB
ATRIAL RATE: 97 BPM
CALCULATED P AXIS, ECG09: 82 DEGREES
CALCULATED R AXIS, ECG10: -83 DEGREES
CALCULATED T AXIS, ECG11: 47 DEGREES
DIAGNOSIS, 93000: NORMAL
GLUCOSE BLD STRIP.AUTO-MCNC: 101 MG/DL (ref 70–110)
GLUCOSE BLD STRIP.AUTO-MCNC: 112 MG/DL (ref 70–110)
GLUCOSE BLD STRIP.AUTO-MCNC: 128 MG/DL (ref 70–110)
GLUCOSE BLD STRIP.AUTO-MCNC: 133 MG/DL (ref 70–110)
P-R INTERVAL, ECG05: 124 MS
Q-T INTERVAL, ECG07: 364 MS
QRS DURATION, ECG06: 118 MS
QTC CALCULATION (BEZET), ECG08: 462 MS
VENTRICULAR RATE, ECG03: 97 BPM

## 2017-12-31 PROCEDURE — 74011250637 HC RX REV CODE- 250/637: Performed by: INTERNAL MEDICINE

## 2017-12-31 PROCEDURE — 97116 GAIT TRAINING THERAPY: CPT

## 2017-12-31 PROCEDURE — 74011250636 HC RX REV CODE- 250/636: Performed by: INTERNAL MEDICINE

## 2017-12-31 PROCEDURE — 74011250637 HC RX REV CODE- 250/637: Performed by: HOSPITALIST

## 2017-12-31 PROCEDURE — 94760 N-INVAS EAR/PLS OXIMETRY 1: CPT

## 2017-12-31 PROCEDURE — 82962 GLUCOSE BLOOD TEST: CPT

## 2017-12-31 PROCEDURE — 65660000000 HC RM CCU STEPDOWN

## 2017-12-31 PROCEDURE — 74011000250 HC RX REV CODE- 250: Performed by: INTERNAL MEDICINE

## 2017-12-31 PROCEDURE — 74011250636 HC RX REV CODE- 250/636: Performed by: HOSPITALIST

## 2017-12-31 PROCEDURE — 94640 AIRWAY INHALATION TREATMENT: CPT

## 2017-12-31 RX ORDER — DIPHENHYDRAMINE HYDROCHLORIDE 50 MG/ML
12.5 INJECTION, SOLUTION INTRAMUSCULAR; INTRAVENOUS
Status: DISCONTINUED | OUTPATIENT
Start: 2017-12-31 | End: 2018-01-02 | Stop reason: HOSPADM

## 2017-12-31 RX ADMIN — METHYLPREDNISOLONE SODIUM SUCCINATE 40 MG: 40 INJECTION, POWDER, FOR SOLUTION INTRAMUSCULAR; INTRAVENOUS at 11:38

## 2017-12-31 RX ADMIN — HEPARIN SODIUM 5000 UNITS: 5000 INJECTION, SOLUTION INTRAVENOUS; SUBCUTANEOUS at 01:09

## 2017-12-31 RX ADMIN — DIPHENHYDRAMINE HYDROCHLORIDE 12.5 MG: 50 INJECTION, SOLUTION INTRAMUSCULAR; INTRAVENOUS at 20:11

## 2017-12-31 RX ADMIN — METHYLPREDNISOLONE SODIUM SUCCINATE 40 MG: 40 INJECTION, POWDER, FOR SOLUTION INTRAMUSCULAR; INTRAVENOUS at 17:14

## 2017-12-31 RX ADMIN — METHYLPREDNISOLONE SODIUM SUCCINATE 40 MG: 40 INJECTION, POWDER, FOR SOLUTION INTRAMUSCULAR; INTRAVENOUS at 21:34

## 2017-12-31 RX ADMIN — PANTOPRAZOLE SODIUM 40 MG: 40 TABLET, DELAYED RELEASE ORAL at 08:29

## 2017-12-31 RX ADMIN — IPRATROPIUM BROMIDE AND ALBUTEROL SULFATE 3 ML: .5; 3 SOLUTION RESPIRATORY (INHALATION) at 19:59

## 2017-12-31 RX ADMIN — HEPARIN SODIUM 5000 UNITS: 5000 INJECTION, SOLUTION INTRAVENOUS; SUBCUTANEOUS at 08:29

## 2017-12-31 RX ADMIN — IPRATROPIUM BROMIDE AND ALBUTEROL SULFATE 3 ML: .5; 3 SOLUTION RESPIRATORY (INHALATION) at 12:21

## 2017-12-31 RX ADMIN — ASPIRIN AND DIPYRIDAMOLE 1 CAPSULE: 25; 200 CAPSULE, EXTENDED RELEASE ORAL at 08:29

## 2017-12-31 RX ADMIN — GUAIFENESIN 600 MG: 600 TABLET, EXTENDED RELEASE ORAL at 21:34

## 2017-12-31 RX ADMIN — DOXYCYCLINE 100 MG: 100 CAPSULE ORAL at 21:41

## 2017-12-31 RX ADMIN — IPRATROPIUM BROMIDE AND ALBUTEROL SULFATE 3 ML: .5; 3 SOLUTION RESPIRATORY (INHALATION) at 07:21

## 2017-12-31 RX ADMIN — HEPARIN SODIUM 5000 UNITS: 5000 INJECTION, SOLUTION INTRAVENOUS; SUBCUTANEOUS at 17:15

## 2017-12-31 RX ADMIN — IPRATROPIUM BROMIDE AND ALBUTEROL SULFATE 3 ML: .5; 3 SOLUTION RESPIRATORY (INHALATION) at 16:13

## 2017-12-31 RX ADMIN — ASPIRIN AND DIPYRIDAMOLE 1 CAPSULE: 25; 200 CAPSULE, EXTENDED RELEASE ORAL at 21:34

## 2017-12-31 RX ADMIN — METHYLPREDNISOLONE SODIUM SUCCINATE 40 MG: 40 INJECTION, POWDER, FOR SOLUTION INTRAMUSCULAR; INTRAVENOUS at 04:55

## 2017-12-31 RX ADMIN — DOXYCYCLINE 100 MG: 100 CAPSULE ORAL at 08:30

## 2017-12-31 RX ADMIN — PANTOPRAZOLE SODIUM 40 MG: 40 TABLET, DELAYED RELEASE ORAL at 21:34

## 2017-12-31 RX ADMIN — GUAIFENESIN 600 MG: 600 TABLET, EXTENDED RELEASE ORAL at 08:29

## 2017-12-31 NOTE — ROUTINE PROCESS
Bedside and Verbal shift change report given to Calvin Martin RN (oncoming nurse) by Ladarius Pulido RN (offgoing nurse). Report included the following information SBAR, Kardex, Intake/Output, MAR, Recent Results, Med Rec Status and Cardiac Rhythm SR BBB.

## 2017-12-31 NOTE — PROGRESS NOTES
1900:  Bedside and Verbal shift change report received from Cynthia Palomo RN (offgoing nurse) to Marry Kulkarni RN (oncoming nurse). Report included the following information SBAR, Kardex, Intake/Output, MAR, Recent Results and Cardiac Rhythm SR. Pt resting in bed. Appears to be in no distress at this time. Call bell within reach. Zone phone number given to pt. Pt instructed to call zone phone or call bell if needed. Pt instructed to call for assistance before ambulating. Shift summary:  Pt had uneventful shift.

## 2017-12-31 NOTE — PROGRESS NOTES
Hospitalist Progress Note    Patient: Jimena Chávez MRN: [id number]  CSN: [id number]    YOB: 1945  Age: 81 y.o. Sex: female    DOA: 12/27/2017 LOS:  LOS: 4 days          Chief Complaint: copd          Assessment/Plan       Disposition :  Patient Active Problem List   Diagnosis Code    Degeneration of cervical intervertebral disc M50.30    Cervicalgia M54.2    Cervical spondylosis without myelopathy M47.812    Pain in limb M79.609    Pain in joint, shoulder region M25.519    Encounter for long-term (current) use of other medications Z79.899    DDD (degenerative disc disease), cervical M50.30    COPD exacerbation ([facility] [address] J44.1    Bronchitis J40    Dehydration E86.0    HTN (hypertension) I10        1.  COPD exacerbation. 2.  Dehydration. 3.  Bronchitis. 4.  Lactic acid elevation secondary to dehydration. 5.  GERD. 6.  Dyslipidemia. 7.  History of anemia. 8.  Essential hypertension. 9.  History of stroke. 10. Lactic acidosis in the setting of copd poa.     Still needs inpatient care. Lungs still tight and wheezy. Does not meet criteria for dc yet. Suspect she will need 24-48 more hrs. Subjective:    Notes improvement though still gets out of breath easily. Wheezing sill present. Review of systems:    Constitutional: denies fevers, chills, myalgias  Respiratory: denies SOB, cough  Cardiovascular: denies chest pain, palpitations  Gastrointestinal: denies nausea, vomiting, diarrhea      Vital signs/Intake and Output:  Visit Vitals    /75 (BP Patient Position: At rest)    Pulse 98    Temp 98.2 °F (36.8 °C)    Resp 18    Ht 5' 8\" (1.727 m)    Wt 87.3 kg (192 lb 7.4 oz)    SpO2 94%    BMI 29.26 kg/m2     Current Shift:     Last three shifts:  12/29 1901 - 12/31 0700  In: 2980 [P.O.:2980]  Out: 2800 [Urine:2800]    Exam:    General: NAD  Head/Neck: MMM  CVS:Regular rate and rhythm, no M/R/G, S1/S2 heard, no thrill  Lungs:B/l wheezing. Tight. Diminished air movement. No distress. Abdomen: Soft, Nontender, No distention, Normal Bowel sounds, No hepatomegaly  Extremities: No edema. Labs: Results:       Chemistry No results for input(s): GLU, NA, K, CL, CO2, BUN, CREA, CA, AGAP, BUCR, TBIL, GPT, AP, TP, ALB, GLOB, AGRAT in the last 72 hours. CBC w/Diff No results for input(s): WBC, RBC, HGB, HCT, PLT, GRANS, LYMPH, EOS, HGBEXT, HCTEXT, PLTEXT in the last 72 hours. Cardiac Enzymes No results for input(s): CPK, CKND1, ESTEFANY in the last 72 hours. No lab exists for component: CKRMB, TROIP   Coagulation No results for input(s): PTP, INR, APTT in the last 72 hours. No lab exists for component: INREXT    Lipid Panel No results found for: CHOL, CHOLPOCT, CHOLX, CHLST, CHOLV, 323639, HDL, LDL, LDLC, DLDLP, 885436, VLDLC, VLDL, TGLX, TRIGL, TRIGP, TGLPOCT, CHHD, CHHDX   BNP No results for input(s): BNPP in the last 72 hours. Liver Enzymes No results for input(s): TP, ALB, TBIL, AP, SGOT, GPT in the last 72 hours.     No lab exists for component: DBIL   Thyroid Studies No results found for: T4, T3U, TSH, TSHEXT     Procedures/imaging: see electronic medical records for all procedures/Xrays and details which were not copied into this note but were reviewed prior to creation of Jayro Taylor MD

## 2017-12-31 NOTE — PROGRESS NOTES
Problem: Falls - Risk of  Goal: *Absence of Falls  Document Jael Fall Risk and appropriate interventions in the flowsheet.    Outcome: Progressing Towards Goal  Fall Risk Interventions:            Medication Interventions: Bed/chair exit alarm, Patient to call before getting OOB, Teach patient to arise slowly         History of Falls Interventions: Evaluate medications/consider consulting pharmacy, Room close to nurse's station

## 2017-12-31 NOTE — PROGRESS NOTES
Problem: Mobility Impaired (Adult and Pediatric)  Goal: *Acute Goals and Plan of Care (Insert Text)  Physical Therapy Goals  Initiated 12/29/2017 and to be accomplished within 3-7 day(s)  1. Patient will move from supine to sit and sit to supine  in bed with independence. 2.  Patient will transfer from bed to chair and chair to bed with independence using the least restrictive device. 3.  Patient will perform sit to stand with modified independence. 4.  Patient will ambulate with modified independence for 300 feet with the least restrictive device. 5.  Patient will ascend/descend 3 stairs with B handrail(s) with supervision/set-up. Outcome: Progressing Towards Goal  physical Therapy TREATMENT    Patient: Xena Avila (13 y.o. female)  Date: 12/31/2017  Diagnosis: COPD exacerbation (HCC) COPD exacerbation ([facility] [address]       Precautions: Fall   Chart, physical therapy assessment, plan of care and goals were reviewed. ASSESSMENT:  Pt seen in bed w/ telemonitor in place. Pt reported no pain at this time. Pt is tachycardic  and desats during activity. Prior to session pt's O2 levels were assessed at 92 and HR at 120 while sitting at the EOB. Pt able to ambulate 300 ft w/ GB but required 2 standing rest breaks secondary to decrease activity tolerance and O2 levels decreasing to 88 w/ HR in the 140s. Pt given VCing for proper breathing technique and pt's O2 levels were able to increase between 90-91 w/ HR remaining at 140s during the entire gait training. Pt transferred back to room where pt performed therex. Pt left sitting in upright chair after session, call bell and tray in reach, nurse notified after session. Will follow POC and continue to increase pt's endurance during mobility task.    Progression toward goals:  [x]      Improving appropriately and progressing toward goals  []      Improving slowly and progressing toward goals  []      Not making progress toward goals and plan of care will be adjusted PLAN:  Patient continues to benefit from skilled intervention to address the above impairments. Continue treatment per established plan of care. Discharge Recommendations:  Home Health  Further Equipment Recommendations for Discharge:  N/A     SUBJECTIVE:   Patient stated I've been waiting to get out of bed.     OBJECTIVE DATA SUMMARY:   Critical Behavior:  Functional Mobility Training:  Bed Mobility:  Supine to Sit: Modified independent  Sit to Supine: Modified independent  Transfers:  Sit to Stand: Modified independent  Stand to Sit: Modified independent  Balance:  Sitting: Intact  Standing: Intact; Without support  Ambulation/Gait Training:  Distance (ft): 300 Feet (ft)  Assistive Device: Gait belt  Ambulation - Level of Assistance: Supervision  Gait Abnormalities: Decreased step clearance  Base of Support: Widened  Speed/Inez: Slow  Step Length: Left shortened;Right shortened  Therapeutic Exercises:   LAQ 2 x10  Seated Marches 2x 10  Ankle pumps x 10  Hip ABD/ADD 2x10  SAQ 2x10  Pain:  Pain Scale 1: Numeric (0 - 10)  Pain Intensity 1: 0  Activity Tolerance:   Good  Please refer to the flowsheet for vital signs taken during this treatment.   After treatment:   [x] Patient left in no apparent distress sitting up in chair  [] Patient left in no apparent distress in bed  [x] Call bell left within reach  [x] Nursing notified  [] Caregiver present  [] Bed alarm activated      Ailyn Quezada PT   Time Calculation: 16 mins

## 2018-01-01 LAB
GLUCOSE BLD STRIP.AUTO-MCNC: 112 MG/DL (ref 70–110)
GLUCOSE BLD STRIP.AUTO-MCNC: 121 MG/DL (ref 70–110)
GLUCOSE BLD STRIP.AUTO-MCNC: 128 MG/DL (ref 70–110)

## 2018-01-01 PROCEDURE — 94760 N-INVAS EAR/PLS OXIMETRY 1: CPT

## 2018-01-01 PROCEDURE — 74011000250 HC RX REV CODE- 250: Performed by: INTERNAL MEDICINE

## 2018-01-01 PROCEDURE — 82962 GLUCOSE BLOOD TEST: CPT

## 2018-01-01 PROCEDURE — 94640 AIRWAY INHALATION TREATMENT: CPT

## 2018-01-01 PROCEDURE — 65660000000 HC RM CCU STEPDOWN

## 2018-01-01 PROCEDURE — 74011250637 HC RX REV CODE- 250/637: Performed by: HOSPITALIST

## 2018-01-01 PROCEDURE — 97116 GAIT TRAINING THERAPY: CPT

## 2018-01-01 PROCEDURE — 74011250636 HC RX REV CODE- 250/636: Performed by: INTERNAL MEDICINE

## 2018-01-01 PROCEDURE — 74011250637 HC RX REV CODE- 250/637: Performed by: INTERNAL MEDICINE

## 2018-01-01 RX ADMIN — METHYLPREDNISOLONE SODIUM SUCCINATE 40 MG: 40 INJECTION, POWDER, FOR SOLUTION INTRAMUSCULAR; INTRAVENOUS at 04:43

## 2018-01-01 RX ADMIN — IPRATROPIUM BROMIDE AND ALBUTEROL SULFATE 3 ML: .5; 3 SOLUTION RESPIRATORY (INHALATION) at 20:10

## 2018-01-01 RX ADMIN — GUAIFENESIN 600 MG: 600 TABLET, EXTENDED RELEASE ORAL at 21:19

## 2018-01-01 RX ADMIN — ASPIRIN AND DIPYRIDAMOLE 1 CAPSULE: 25; 200 CAPSULE, EXTENDED RELEASE ORAL at 09:14

## 2018-01-01 RX ADMIN — IPRATROPIUM BROMIDE AND ALBUTEROL SULFATE 3 ML: .5; 3 SOLUTION RESPIRATORY (INHALATION) at 07:18

## 2018-01-01 RX ADMIN — PANTOPRAZOLE SODIUM 40 MG: 40 TABLET, DELAYED RELEASE ORAL at 09:15

## 2018-01-01 RX ADMIN — GUAIFENESIN 600 MG: 600 TABLET, EXTENDED RELEASE ORAL at 09:14

## 2018-01-01 RX ADMIN — HEPARIN SODIUM 5000 UNITS: 5000 INJECTION, SOLUTION INTRAVENOUS; SUBCUTANEOUS at 09:14

## 2018-01-01 RX ADMIN — ASPIRIN AND DIPYRIDAMOLE 1 CAPSULE: 25; 200 CAPSULE, EXTENDED RELEASE ORAL at 21:19

## 2018-01-01 RX ADMIN — DOXYCYCLINE 100 MG: 100 CAPSULE ORAL at 21:25

## 2018-01-01 RX ADMIN — METHYLPREDNISOLONE SODIUM SUCCINATE 40 MG: 40 INJECTION, POWDER, FOR SOLUTION INTRAMUSCULAR; INTRAVENOUS at 18:17

## 2018-01-01 RX ADMIN — DOXYCYCLINE 100 MG: 100 CAPSULE ORAL at 12:04

## 2018-01-01 RX ADMIN — PANTOPRAZOLE SODIUM 40 MG: 40 TABLET, DELAYED RELEASE ORAL at 21:18

## 2018-01-01 RX ADMIN — HEPARIN SODIUM 5000 UNITS: 5000 INJECTION, SOLUTION INTRAVENOUS; SUBCUTANEOUS at 18:17

## 2018-01-01 RX ADMIN — HEPARIN SODIUM 5000 UNITS: 5000 INJECTION, SOLUTION INTRAVENOUS; SUBCUTANEOUS at 01:20

## 2018-01-01 RX ADMIN — IPRATROPIUM BROMIDE AND ALBUTEROL SULFATE 3 ML: .5; 3 SOLUTION RESPIRATORY (INHALATION) at 15:27

## 2018-01-01 RX ADMIN — METHYLPREDNISOLONE SODIUM SUCCINATE 40 MG: 40 INJECTION, POWDER, FOR SOLUTION INTRAMUSCULAR; INTRAVENOUS at 09:15

## 2018-01-01 RX ADMIN — IPRATROPIUM BROMIDE AND ALBUTEROL SULFATE 3 ML: .5; 3 SOLUTION RESPIRATORY (INHALATION) at 11:23

## 2018-01-01 RX ADMIN — METHYLPREDNISOLONE SODIUM SUCCINATE 40 MG: 40 INJECTION, POWDER, FOR SOLUTION INTRAMUSCULAR; INTRAVENOUS at 21:18

## 2018-01-01 NOTE — PROGRESS NOTES
Problem: Mobility Impaired (Adult and Pediatric)  Goal: *Acute Goals and Plan of Care (Insert Text)  Physical Therapy Goals  Initiated 12/29/2017 and to be accomplished within 3-7 day(s)  1. Patient will move from supine to sit and sit to supine  in bed with independence. 2.  Patient will transfer from bed to chair and chair to bed with independence using the least restrictive device. 3.  Patient will perform sit to stand with modified independence. 4.  Patient will ambulate with modified independence for 300 feet with the least restrictive device. 5.  Patient will ascend/descend 3 stairs with B handrail(s) with supervision/set-up. Outcome: Progressing Towards Goal  physical Therapy TREATMENT    Patient: Lotus Madden (85 y.o. female)  Date: 1/1/2018  Diagnosis: COPD exacerbation ([address] COPD exacerbation ([facility] [address]  Precautions: Fall   Chart, physical therapy assessment, plan of care and goals were reviewed. ASSESSMENT:  Pt demonstrated improved tolerance to ambulation today. Pt amb 400 ft on RA, s/AD, GB applied, supervision for safety. Pt required 4-5 standing rest breaks during amb, SPO2 remained 90-95% during amb, HR increased up to 138 however with rest quickly decreased into 120s. Plan to attempt stair training next session to prepare for safe d/c home and monitor vitals with increased exertion involved with stairs. Pt return to bed and left sitting EOB with visitor present, call bell within reach, all needs met. Progression toward goals:  [x]      Improving appropriately and progressing toward goals  []      Improving slowly and progressing toward goals  []      Not making progress toward goals and plan of care will be adjusted     PLAN:  Patient continues to benefit from skilled intervention to address the above impairments. Continue treatment per established plan of care.   Discharge Recommendations:  Home Health  Further Equipment Recommendations for Discharge:  N/A     SUBJECTIVE:   Patient stated I am feeling better today.     OBJECTIVE DATA SUMMARY:   Functional Mobility Training:  Bed Mobility:  Supine to Sit: Modified independent  Sit to Supine: Modified independent  Transfers:  Sit to Stand: Modified independent  Stand to Sit: Modified independent  Balance:  Sitting: Intact  Standing: Intact  Ambulation/Gait Training:  Distance (ft): 400 Feet (ft)  Assistive Device: Gait belt  Ambulation - Level of Assistance: Supervision  Gait Abnormalities: Decreased step clearance  Base of Support: Widened  Speed/Inez: Slow  Step Length: Left shortened;Right shortened  Pain:  Pain Scale 1: Numeric (0 - 10)  Pain Intensity 1: 0  Activity Tolerance:   Good  Please refer to the flowsheet for vital signs taken during this treatment.   After treatment:   [] Patient left in no apparent distress sitting up in chair  [x] Patient left in no apparent distress in bed  [x] Call bell left within reach  [x] Nursing notified  [] Caregiver present  [] Bed alarm activated      Tawana Kilgore   Time Calculation: 33 mins

## 2018-01-01 NOTE — PROGRESS NOTES
Bedside and Verbal shift change report received from Panchito Alonzo RN (offgoing nurse) to Farhat Crow RN (oncoming nurse). Report included the following information SBAR, Kardex, Intake/Output, MAR, Recent Results and Cardiac Rhythm SR BBB. Pt resting in bed. Appears to be in no distress at this time. Call bell within reach. Zone phone number given to pt. Pt instructed to call zone phone or call bell if needed. Pt instructed to call for assistance before ambulating. Shift summary:  Pt had uneventful shift.

## 2018-01-01 NOTE — ROUTINE PROCESS
Bedside and Verbal shift change report given to Mick Jaramillo RN (oncoming nurse) by Afua Cowan RN (offgoing nurse). Report included the following information SBAR, Kardex, Intake/Output, MAR, Recent Results, Med Rec Status and Cardiac Rhythm SR BBB.

## 2018-01-01 NOTE — PROGRESS NOTES
Hospitalist Progress Note    Patient: Rissa Murguia MRN: [id number]  CSN: [id number]    YOB: 1945  Age: 81 y.o. Sex: female    DOA: 12/27/2017 LOS:  LOS: 5 days          Chief Complaint: copd exacerbation          Assessment/Plan     Disposition :  Patient Active Problem List   Diagnosis Code    Degeneration of cervical intervertebral disc M50.30    Cervicalgia M54.2    Cervical spondylosis without myelopathy M47.812    Pain in limb M79.609    Pain in joint, shoulder region M25.519    Encounter for long-term (current) use of other medications Z79.899    DDD (degenerative disc disease), cervical M50.30    COPD exacerbation ([facility] [address] J44.1    Bronchitis J40    Dehydration E86.0    HTN (hypertension) I10     1.  COPD exacerbation. 2.  Dehydration. 3.  Bronchitis. 4.  Lactic acid elevation secondary to dehydration. 5.  GERD. 6.  Dyslipidemia. 7.  History of anemia. 8.  Essential hypertension. 9.  History of stroke. 10. Lactic acidosis in the setting of copd poa.      Still needs inpatient care. Lungs still tight and wheezy. Does not meet criteria for dc yet. Suspect she will need 24-48 more hrs. Possible dc tomorrow. I do not anticipate any special dc needs .               Subjective:    Getting better though still wheezing.        Review of systems:    Constitutional: denies fevers, chills, myalgias  Respiratory: denies SOB, cough  Cardiovascular: denies chest pain, palpitations  Gastrointestinal: denies nausea, vomiting, diarrhea      Vital signs/Intake and Output:  Visit Vitals    /76 (BP 1 Location: Left arm, BP Patient Position: At rest)    Pulse (!) 112    Temp 97.5 °F (36.4 °C)    Resp 16    Ht 5' 8\" (1.727 m)    Wt 87.3 kg (192 lb 7.4 oz)    SpO2 95%    BMI 29.26 kg/m2     Current Shift:  01/01 0701 - 01/01 1900  In: 240 [P.O.:240]  Out: 350 [Urine:350]  Last three shifts:  12/30 1901 - 01/01 0700  In: 1080 [P.O.:1080]  Out: 2050 [[facility]:0102]    Exam:    General: nad completes sentences. Head/Neck: mmm  CVS:Regular rate and rhythm, no M/R/G, S1/S2 heard, no thrill  Lungs:b/l wheezing. Tight. Extremities: No C/C/E, pulses palpable 2+                    Labs: Results:       Chemistry No results for input(s): GLU, NA, K, CL, CO2, BUN, CREA, CA, AGAP, BUCR, TBIL, GPT, AP, TP, ALB, GLOB, AGRAT in the last 72 hours. CBC w/Diff No results for input(s): WBC, RBC, HGB, HCT, PLT, GRANS, LYMPH, EOS, HGBEXT, HCTEXT, PLTEXT in the last 72 hours. Cardiac Enzymes No results for input(s): CPK, CKND1, ESTEFANY in the last 72 hours. No lab exists for component: CKRMB, TROIP   Coagulation No results for input(s): PTP, INR, APTT in the last 72 hours. No lab exists for component: INREXT    Lipid Panel No results found for: CHOL, CHOLPOCT, CHOLX, CHLST, CHOLV, 874709, HDL, LDL, LDLC, DLDLP, 654363, VLDLC, VLDL, TGLX, TRIGL, TRIGP, TGLPOCT, CHHD, CHHDX   BNP No results for input(s): BNPP in the last 72 hours. Liver Enzymes No results for input(s): TP, ALB, TBIL, AP, SGOT, GPT in the last 72 hours.     No lab exists for component: DBIL   Thyroid Studies No results found for: T4, T3U, TSH, TSHEXT     Procedures/imaging: see electronic medical records for all procedures/Xrays and details which were not copied into this note but were reviewed prior to creation of Reny Lomas MD

## 2018-01-01 NOTE — PROGRESS NOTES
Problem: Falls - Risk of  Goal: *Absence of Falls  Document Jael Fall Risk and appropriate interventions in the flowsheet.    Outcome: Progressing Towards Goal  Fall Risk Interventions:            Medication Interventions: Bed/chair exit alarm, Patient to call before getting OOB, Teach patient to arise slowly         History of Falls Interventions: Room close to nurse's station

## 2018-01-01 NOTE — PROGRESS NOTES
1900:  Bedside and Verbal shift change report received from Sherrill Kim, RN (offgoing nurse) to Alpesh Amrao RN (oncoming nurse). Report included the following information SBAR, Kardex, Intake/Output, MAR, Recent Results and Cardiac Rhythm SR BBB. Pt resting in bed. Appears to be in no distress at this time. Call bell within reach. Zone phone number given to pt. Pt instructed to call zone phone or call bell if needed. Pt instructed to call for assistance before ambulating. Shift summary:  Pt had uneventful shift.

## 2018-01-02 VITALS
RESPIRATION RATE: 16 BRPM | TEMPERATURE: 97.9 F | HEART RATE: 105 BPM | OXYGEN SATURATION: 96 % | WEIGHT: 191.58 LBS | HEIGHT: 68 IN | DIASTOLIC BLOOD PRESSURE: 71 MMHG | SYSTOLIC BLOOD PRESSURE: 128 MMHG | BODY MASS INDEX: 29.04 KG/M2

## 2018-01-02 LAB
BACTERIA SPEC CULT: NORMAL
GLUCOSE BLD STRIP.AUTO-MCNC: 112 MG/DL (ref 70–110)
GLUCOSE BLD STRIP.AUTO-MCNC: 125 MG/DL (ref 70–110)
SERVICE CMNT-IMP: NORMAL

## 2018-01-02 PROCEDURE — 94640 AIRWAY INHALATION TREATMENT: CPT

## 2018-01-02 PROCEDURE — 74011250637 HC RX REV CODE- 250/637: Performed by: HOSPITALIST

## 2018-01-02 PROCEDURE — 74011000250 HC RX REV CODE- 250: Performed by: INTERNAL MEDICINE

## 2018-01-02 PROCEDURE — 97116 GAIT TRAINING THERAPY: CPT

## 2018-01-02 PROCEDURE — 94760 N-INVAS EAR/PLS OXIMETRY 1: CPT

## 2018-01-02 PROCEDURE — 82962 GLUCOSE BLOOD TEST: CPT

## 2018-01-02 PROCEDURE — 74011250636 HC RX REV CODE- 250/636: Performed by: INTERNAL MEDICINE

## 2018-01-02 PROCEDURE — 74011250637 HC RX REV CODE- 250/637: Performed by: INTERNAL MEDICINE

## 2018-01-02 RX ORDER — PREDNISONE 10 MG/1
TABLET ORAL
Qty: 30 TAB | Refills: 0 | Status: SHIPPED | OUTPATIENT
Start: 2018-01-02 | End: 2020-10-14

## 2018-01-02 RX ADMIN — HEPARIN SODIUM 5000 UNITS: 5000 INJECTION, SOLUTION INTRAVENOUS; SUBCUTANEOUS at 08:51

## 2018-01-02 RX ADMIN — ASPIRIN AND DIPYRIDAMOLE 1 CAPSULE: 25; 200 CAPSULE, EXTENDED RELEASE ORAL at 08:50

## 2018-01-02 RX ADMIN — METHYLPREDNISOLONE SODIUM SUCCINATE 40 MG: 40 INJECTION, POWDER, FOR SOLUTION INTRAMUSCULAR; INTRAVENOUS at 04:18

## 2018-01-02 RX ADMIN — METHYLPREDNISOLONE SODIUM SUCCINATE 40 MG: 40 INJECTION, POWDER, FOR SOLUTION INTRAMUSCULAR; INTRAVENOUS at 09:01

## 2018-01-02 RX ADMIN — HEPARIN SODIUM 5000 UNITS: 5000 INJECTION, SOLUTION INTRAVENOUS; SUBCUTANEOUS at 00:12

## 2018-01-02 RX ADMIN — GUAIFENESIN 600 MG: 600 TABLET, EXTENDED RELEASE ORAL at 08:50

## 2018-01-02 RX ADMIN — DOXYCYCLINE 100 MG: 100 CAPSULE ORAL at 08:53

## 2018-01-02 RX ADMIN — IPRATROPIUM BROMIDE AND ALBUTEROL SULFATE 3 ML: .5; 3 SOLUTION RESPIRATORY (INHALATION) at 11:12

## 2018-01-02 RX ADMIN — PANTOPRAZOLE SODIUM 40 MG: 40 TABLET, DELAYED RELEASE ORAL at 08:50

## 2018-01-02 RX ADMIN — IPRATROPIUM BROMIDE AND ALBUTEROL SULFATE 3 ML: .5; 3 SOLUTION RESPIRATORY (INHALATION) at 07:04

## 2018-01-02 NOTE — DISCHARGE SUMMARY
Discharge Summary    Patient: Abraham Armas MRN: [id number]  CSN: [id number]    YOB: 1945  Age: 81 y.o.   Sex: female    DOA: 12/27/2017 LOS:  LOS: 6 days   Discharge Date: 1/2/2018     Primary Care Provider:  Alex Cuadra MD    Admission Diagnoses: COPD exacerbation [facility])    Discharge Diagnoses:    Problem List as of 1/2/2018  Date Reviewed: 12/27/2017          Codes Class Noted - Resolved    * (Principal)COPD exacerbation ([facility] [address] ICD-10-CM: J44.1  ICD-9-CM: 491.21  12/27/2017 - Present        Bronchitis ICD-10-CM: J40  ICD-9-CM: 490  12/27/2017 - Present        Dehydration ICD-10-CM: E86.0  ICD-9-CM: 276.51  12/27/2017 - Present        HTN (hypertension) ICD-10-CM: I10  ICD-9-CM: 401.9  12/27/2017 - Present        DDD (degenerative disc disease), cervical ICD-10-CM: M50.30  ICD-9-CM: 722.4  3/27/2013 - Present        Degeneration of cervical intervertebral disc ICD-10-CM: M50.30  ICD-9-CM: 722.4  Unknown - Present        Cervicalgia ICD-10-CM: M54.2  ICD-9-CM: 723.1  Unknown - Present        Cervical spondylosis without myelopathy ICD-10-CM: M47.812  ICD-9-CM: 721.0  Unknown - Present        Pain in limb ICD-10-CM: M79.609  ICD-9-CM: 729.5  Unknown - Present        Pain in joint, shoulder region ICD-10-CM: M25.519  ICD-9-CM: 719.41  Unknown - Present        Encounter for long-term (current) use of other medications ICD-10-CM: Z79.899  ICD-9-CM: V58.69  Unknown - Present        RESOLVED: HNP (herniated nucleus pulposus), cervical ICD-10-CM: M50.20  ICD-9-CM: 722.0  3/27/2013 - 4/3/2013        RESOLVED: Cervical spinal stenosis ICD-10-CM: M48.02  ICD-9-CM: 723.0  3/27/2013 - 4/3/2013              Discharge Medications:     Discharge Medication List as of 1/2/2018  2:20 PM      CONTINUE these medications which have CHANGED    Details   predniSONE (DELTASONE) 10 mg tablet Prednisone 10mg tabs: p.o.  4 tabs daily for 3 days then drop to   3 tabs daily for 3 days then drop to   2 tabs daily for 3 days then drop to   1 tab daily for 3 days then stop. Dispense 30 tabs, Print, Disp-30 Tab, R-0         CONTINUE these medications which have NOT CHANGED    Details   cyanocobalamin 1,000 mcg tablet Take 1,000 mcg by mouth daily. , Historical Med      atorvastatin (LIPITOR) 80 mg tablet Take 80 mg by mouth every evening., Historical Med      ferrous sulfate (IRON) 325 mg (65 mg iron) tablet Take  by mouth Daily (before breakfast). , Historical Med      amitriptyline (ELAVIL) 25 mg tablet Take 75 mg by mouth nightly. Indications: MIGRAINE PREVENTION, Historical Med      biotin 1,000 mcg chew Take 2,000 mcg by mouth two (2) times a day. This herbal, alternative, and/or nutritional/dietary supplement product will not be given during hospital stay per [facility] approved policy. Please reorder upon discharge if appropriate., Historical Med      calcium-cholecalciferol, D3, (CALTRATE 600+D) tablet Take 1 Tab by mouth daily. , Historical Med      fluticasone-salmeterol (ADVAIR) 500-50 mcg/dose diskus inhaler Take 1 Puff by inhalation every twelve (12) hours. , Historical Med      pantoprazole (PROTONIX) 40 mg tablet Take 40 mg by mouth two (2) times a day., Historical Med      cholecalciferol (VITAMIN D3) 400 unit tab tablet Take 400 Units by mouth daily. , Historical Med      atenolol (TENORMIN) 25 mg tablet Take 25 mg by mouth every evening. Indications: MIGRAINE PREVENTION, to manage SE of amitriptyline, Historical Med      dipyridamole-aspirin (AGGRENOX) 200-25 mg per SR capsule Take 1 Cap by mouth two (2) times a day. Indications: PREVENTION OF CEREBRAL THROMBOSIS, h/o TIA, Historical Med      ezetimibe (ZETIA) 10 mg tablet Take 10 mg by mouth every evening., Historical Med      tiotropium (SPIRIVA WITH HANDIHALER) 18 mcg inhalation capsule Not intended for PRN useTake 1 Cap by inhalation daily. Historical Med, 1 Cap      albuterol (PROVENTIL, VENTOLIN) 90 mcg/Actuation inhaler Take 2 Puffs by inhalation every six (6) hours as needed. Historical Med, 2 Puff         STOP taking these medications       doxycycline (MONODOX) 100 mg capsule Comments:   Reason for Stopping:         ranitidine (ZANTAC) 150 mg tablet Comments:   Reason for Stopping:               Discharge Condition: Good          PHYSICAL EXAM   Visit Vitals    /71 (BP 1 Location: Left arm, BP Patient Position: Sitting)    Pulse (!) 105    Temp 97.9 °F (36.6 °C)    Resp 16    Ht 5' 8\" (1.727 m)    Wt 86.9 kg (191 lb 9.3 oz)    SpO2 96%    BMI 29.13 kg/m2     General: Awake, cooperative, no acute distress    HEENT: NC, Atraumatic. PERRLA, EOMI. Anicteric sclerae. Lungs:  CTA Bilaterally. No Wheezing/Rhonchi/Rales. Heart:  Regular  rhythm,  No murmur, No Rubs, No Gallops  Abdomen: Soft, Non distended, Non tender. +Bowel sounds,   Extremities: No c/c/e  Psych:   Not anxious or agitated. Neurologic:  No acute neurological deficits. Admission HPI : The patient is a 79-year-old patient with a past medical history of COPD. She comes into the emergency room with worsening shortness of breath, increased sputum production, increased inhaler use and wheezing. It has been present for about a week. She takes inhalers at home for her COPD and usually does pretty good. Her last hospitalization for COPD was greater than 10 years ago. In the ER she was evaluated and started on steroids and nebulized bronchodilators. She has not progressed as we would hope. We have been asked to bring her into the hospital for continuation of care    Hospital Course :   COPD exacerbation - patient admitted to telemetry, she was started on IV solumedrol, inhaled bronchodilators. It took few days for her chest tightness and wheezing to improve. She was also started on empiric antibiotics. Once her breathing improved we transitioned her to oral steroids. She is now feeling much better and is ambulating with no SOB.  For her chronic stable medical problems, we continued her home medications. She will follow up with her PCP and recommend follow up with pulmonary. Recommend outpatient PFT. Activity: Activity as tolerated    Diet: Diabetic Diet    Follow-up: PCP, pulmonary    Disposition: home    Minutes spent on discharge: 45       Labs: Results:       Chemistry No results for input(s): GLU, NA, K, CL, CO2, BUN, CREA, CA, AGAP, BUCR, TBIL, GPT, AP, TP, ALB, GLOB, AGRAT in the last 72 hours. CBC w/Diff No results for input(s): WBC, RBC, HGB, HCT, PLT, GRANS, LYMPH, EOS, HGBEXT, HCTEXT, PLTEXT in the last 72 hours. Cardiac Enzymes No results for input(s): CPK, CKND1, ESTEFANY in the last 72 hours. No lab exists for component: CKRMB, TROIP   Coagulation No results for input(s): PTP, INR, APTT in the last 72 hours. No lab exists for component: INREXT    Lipid Panel No results found for: CHOL, CHOLPOCT, CHOLX, CHLST, CHOLV, 216875, HDL, LDL, LDLC, DLDLP, 875725, VLDLC, VLDL, TGLX, TRIGL, TRIGP, TGLPOCT, CHHD, CHHDX   BNP No results for input(s): BNPP in the last 72 hours. Liver Enzymes No results for input(s): TP, ALB, TBIL, AP, SGOT, GPT in the last 72 hours. No lab exists for component: DBIL   Thyroid Studies No results found for: T4, T3U, TSH, TSHEXT         Significant Diagnostic Studies: Xr Chest Port    Result Date: 12/27/2017  Portable Chest  History: Shortness of breath Comparison: PA chest 3/19/2013 Portable view of the chest demonstrates the cardiomediastinal silhouette is within normal limits. Cardiac monitoring leads are present. There is no focal consolidation, pleural effusion, or pneumothorax. Degenerative changes are in the spine. Hardware from interval ACDF is seen. IMPRESSION: No acute cardiopulmonary process. No results found for this or any previous visit.         CC: Mel Bedolla MD

## 2018-01-02 NOTE — PROGRESS NOTES
Problem: Mobility Impaired (Adult and Pediatric)  Goal: *Acute Goals and Plan of Care (Insert Text)  Physical Therapy Goals  Initiated 12/29/2017 and to be accomplished within 3-7 day(s)  1. Patient will move from supine to sit and sit to supine  in bed with independence. 2.  Patient will transfer from bed to chair and chair to bed with independence using the least restrictive device. 3.  Patient will perform sit to stand with modified independence. 4.  Patient will ambulate with modified independence for 300 feet with the least restrictive device. 5.  Patient will ascend/descend 3 stairs with B handrail(s) with supervision/set-up. Outcome: Progressing Towards Goal  physical Therapy TREATMENT    Patient: Rita Mensah (2 y.o. female)  Date: 1/2/2018  Diagnosis: COPD exacerbation ([address] COPD exacerbation ([address]  Precautions: Fall   Chart, physical therapy assessment, plan of care and goals were reviewed. ASSESSMENT:  Pt found supine in bed. Demonstrates functional mobility tasks including bed mobility and transfers with modified independence. Completed GT level surface with supervision; verbal cues to pace activity and in breathing through nose/out mouth to limit SOB. Became slightly anxious when notified of plan for stair negotiation this session. Completed descent of 4 steps using R side handrail with both hands at slow single step pace. Upon completion of return up steps, pt c/o feeling light headed, stating it might be due to her anxiety. Pt assisted to sit and rested ~3 min. Sx resolved. Pt continued GT for return to room; O2 sat 94%; . ( following use of bathroom)   Pt left seated EOB with respiratory therapist present for breathing treatment. Overall, continues to progress toward goals. Recommend HHPT upon discharge to return to maximal level of independence and safety with functional mobility tasks.   Progression toward goals:  [x]      Improving appropriately and progressing toward goals  []      Improving slowly and progressing toward goals  []      Not making progress toward goals and plan of care will be adjusted     PLAN:  Patient continues to benefit from skilled intervention to address the above impairments. Continue treatment per established plan of care. Discharge Recommendations:  Home Health  Further Equipment Recommendations for Discharge:  N/A     SUBJECTIVE:   Patient stated I'm doing pretty good.     OBJECTIVE DATA SUMMARY:   Critical Behavior:  Neurologic State: Alert, Appropriate for age  Orientation Level: Oriented X4  Cognition: Follows commands, Appropriate safety awareness, Appropriate for age attention/concentration, Appropriate decision making  Safety/Judgement: Awareness of environment, Fall prevention  Functional Mobility Training:  Bed Mobility:  Supine to Sit: Modified independent  Transfers:  Sit to Stand: Modified independent  Stand to Sit: Modified independent  Balance:  Sitting: Intact  Standing: Intact; Without support  Ambulation/Gait Training:  Distance (ft): 200 Feet (ft)  Assistive Device: Gait belt  Ambulation - Level of Assistance: Supervision  Gait Abnormalities: Decreased step clearance  Speed/Inez: Slow  Step Length: Right shortened;Left shortened  Stairs:  Number of Stairs Trained: 4  Stairs - Level of Assistance: Contact guard assistance  Rail Use: Left  (ascending holding with both hand)  Pain:  Pain Scale 1: Numeric (0 - 10)  Pain Intensity 1: 0  Activity Tolerance:   Fair   Please refer to the flowsheet for vital signs taken during this treatment.   After treatment:   [x] Patient left in no apparent distress sitting up EOB with Respiratory Therapist Radha present  [] Patient left in no apparent distress in bed  [x] Call bell left within reach  [] Nursing notified  [] Caregiver present  [] Bed alarm activated      Nuris Solorio PT   Time Calculation: 24 mins

## 2018-01-02 NOTE — ROUTINE PROCESS
Bedside and Verbal shift change report given to Alesia Gonzalez RN (oncoming nurse) by Bethany Dickerson RN (offgoing nurse). Report included the following information SBAR, Kardex, Intake/Output, MAR, Recent Results, Med Rec Status and Cardiac Rhythm SR BBB.

## 2018-01-02 NOTE — ROUTINE PROCESS
Dual AVS reviewed with ROX Hernandez. All medications reviewed individually with patient. Opportunities for questions and concerns provided. Patient discharged via (mode of transport ie. Car, ambulance or air transport) car. Patient's arm band appropriately discarded.

## 2018-01-02 NOTE — PROGRESS NOTES
Chart reviewed. No new discharge needs identified. Per clinical notes, pt should discharge in 24-48hrs. Pt has been arranged with [facility]. CM will cont to follow.

## 2018-01-02 NOTE — PROGRESS NOTES
Shift uneventful. Pt is stable with no signs of distress.     Patient Vitals for the past 12 hrs:   Temp Pulse Resp BP SpO2   01/01/18 1857 97.3 °F (36.3 °C) (!) 120 18 135/86 98 %   01/01/18 1559 97.4 °F (36.3 °C) 100 20 144/77 98 %   01/01/18 1527 - - - - 97 %   01/01/18 1156 97.5 °F (36.4 °C) (!) 112 16 136/76 95 %   01/01/18 1123 - - - - 94 %   01/01/18 0805 97.8 °F (36.6 °C) (!) 103 16 129/86 95 %

## 2018-01-02 NOTE — ROUTINE PROCESS
Bedside and Verbal shift change report given to Vera Schmitz RN (oncoming nurse) by Elaine Duran RN   (offgoing nurse). Report included the following information SBAR, Kardex, Procedure Summary, Intake/Output, MAR, Recent Results and Med Rec Status.

## 2020-10-14 RX ORDER — ALBUTEROL SULFATE 90 UG/1
2 AEROSOL, METERED RESPIRATORY (INHALATION)
COMMUNITY

## 2020-10-14 NOTE — PROGRESS NOTES
PT aware of NPO status. PT aware of need to hold anticoagulants per protocol. Just morning of procedure. Aggrenox will be held. PT aware of potential for sedation administration and need for  at discharge.  will accompany  PT aware of arrival time pre procedure. Will arrive at 010 for 1230 procedure. Pt states no questions at this time. PT screened for symptoms of COVID-19:  fever, cough, and shortness of breath.

## 2020-10-20 ENCOUNTER — HOSPITAL ENCOUNTER (OUTPATIENT)
Dept: INTERVENTIONAL RADIOLOGY/VASCULAR | Age: 75
Discharge: HOME | End: 2020-10-20
Attending: SURGERY | Admitting: SURGERY
Payer: MEDICARE

## 2020-10-20 VITALS
SYSTOLIC BLOOD PRESSURE: 112 MMHG | HEART RATE: 90 BPM | RESPIRATION RATE: 20 BRPM | OXYGEN SATURATION: 95 % | DIASTOLIC BLOOD PRESSURE: 60 MMHG | HEIGHT: 68 IN | BODY MASS INDEX: 21.54 KG/M2 | TEMPERATURE: 97.8 F | WEIGHT: 142.1 LBS

## 2020-10-20 DIAGNOSIS — C34.90 PRIMARY LUNG ADENOCARCINOMA (HCC): ICD-10-CM

## 2020-10-20 LAB
ANION GAP SERPL CALC-SCNC: 6 MMOL/L (ref 3–18)
BASOPHILS # BLD: 0 K/UL (ref 0–0.1)
BASOPHILS NFR BLD: 1 % (ref 0–2)
BUN SERPL-MCNC: 14 MG/DL (ref 7–18)
BUN/CREAT SERPL: 19 (ref 12–20)
CALCIUM SERPL-MCNC: 9.2 MG/DL (ref 8.5–10.1)
CHLORIDE SERPL-SCNC: 107 MMOL/L (ref 100–111)
CO2 SERPL-SCNC: 28 MMOL/L (ref 21–32)
CREAT SERPL-MCNC: 0.75 MG/DL (ref 0.6–1.3)
DIFFERENTIAL METHOD BLD: ABNORMAL
EOSINOPHIL # BLD: 0.1 K/UL (ref 0–0.4)
EOSINOPHIL NFR BLD: 2 % (ref 0–5)
ERYTHROCYTE [DISTWIDTH] IN BLOOD BY AUTOMATED COUNT: 14 % (ref 11.6–14.5)
GLUCOSE SERPL-MCNC: 102 MG/DL (ref 74–99)
HCT VFR BLD AUTO: 43.3 % (ref 35–45)
HGB BLD-MCNC: 14.4 G/DL (ref 12–16)
INR PPP: 0.9 (ref 0.8–1.2)
LYMPHOCYTES # BLD: 0.8 K/UL (ref 0.9–3.6)
LYMPHOCYTES NFR BLD: 13 % (ref 21–52)
MCH RBC QN AUTO: 32 PG (ref 24–34)
MCHC RBC AUTO-ENTMCNC: 33.3 G/DL (ref 31–37)
MCV RBC AUTO: 96.2 FL (ref 74–97)
MONOCYTES # BLD: 0.7 K/UL (ref 0.05–1.2)
MONOCYTES NFR BLD: 12 % (ref 3–10)
NEUTS SEG # BLD: 4.2 K/UL (ref 1.8–8)
NEUTS SEG NFR BLD: 72 % (ref 40–73)
PLATELET # BLD AUTO: 273 K/UL (ref 135–420)
PMV BLD AUTO: 8.8 FL (ref 9.2–11.8)
POTASSIUM SERPL-SCNC: 4.1 MMOL/L (ref 3.5–5.5)
PROTHROMBIN TIME: 12.2 SEC (ref 11.5–15.2)
RBC # BLD AUTO: 4.5 M/UL (ref 4.2–5.3)
SODIUM SERPL-SCNC: 141 MMOL/L (ref 136–145)
WBC # BLD AUTO: 5.8 K/UL (ref 4.6–13.2)

## 2020-10-20 PROCEDURE — 76937 US GUIDE VASCULAR ACCESS: CPT

## 2020-10-20 PROCEDURE — 99152 MOD SED SAME PHYS/QHP 5/>YRS: CPT

## 2020-10-20 PROCEDURE — 80048 BASIC METABOLIC PNL TOTAL CA: CPT

## 2020-10-20 PROCEDURE — 85025 COMPLETE CBC W/AUTO DIFF WBC: CPT

## 2020-10-20 PROCEDURE — 74011000272 HC RX REV CODE- 272: Performed by: SURGERY

## 2020-10-20 PROCEDURE — 85610 PROTHROMBIN TIME: CPT

## 2020-10-20 PROCEDURE — 74011250636 HC RX REV CODE- 250/636: Performed by: SURGERY

## 2020-10-20 PROCEDURE — 74011000250 HC RX REV CODE- 250: Performed by: SURGERY

## 2020-10-20 RX ORDER — FLUMAZENIL 0.1 MG/ML
0.2 INJECTION INTRAVENOUS
Status: DISCONTINUED | OUTPATIENT
Start: 2020-10-20 | End: 2020-10-20 | Stop reason: HOSPADM

## 2020-10-20 RX ORDER — IPRATROPIUM BROMIDE AND ALBUTEROL SULFATE 2.5; .5 MG/3ML; MG/3ML
3 SOLUTION RESPIRATORY (INHALATION)
COMMUNITY

## 2020-10-20 RX ORDER — SODIUM CHLORIDE 9 MG/ML
25 INJECTION, SOLUTION INTRAVENOUS CONTINUOUS
Status: DISCONTINUED | OUTPATIENT
Start: 2020-10-20 | End: 2020-10-20 | Stop reason: HOSPADM

## 2020-10-20 RX ORDER — CLINDAMYCIN PHOSPHATE 900 MG/50ML
900 INJECTION, SOLUTION INTRAVENOUS ONCE
Status: COMPLETED | OUTPATIENT
Start: 2020-10-20 | End: 2020-10-20

## 2020-10-20 RX ORDER — NALOXONE HYDROCHLORIDE 0.4 MG/ML
0.1 INJECTION, SOLUTION INTRAMUSCULAR; INTRAVENOUS; SUBCUTANEOUS AS NEEDED
Status: DISCONTINUED | OUTPATIENT
Start: 2020-10-20 | End: 2020-10-20 | Stop reason: HOSPADM

## 2020-10-20 RX ORDER — HEPARIN SODIUM (PORCINE) LOCK FLUSH IV SOLN 100 UNIT/ML 100 UNIT/ML
500 SOLUTION INTRAVENOUS
Status: COMPLETED | OUTPATIENT
Start: 2020-10-20 | End: 2020-10-20

## 2020-10-20 RX ORDER — OXYCODONE AND ACETAMINOPHEN 5; 325 MG/1; MG/1
1 TABLET ORAL
Status: DISCONTINUED | OUTPATIENT
Start: 2020-10-20 | End: 2020-10-20 | Stop reason: HOSPADM

## 2020-10-20 RX ORDER — LIDOCAINE HYDROCHLORIDE 10 MG/ML
1-20 INJECTION INFILTRATION; PERINEURAL
Status: DISCONTINUED | OUTPATIENT
Start: 2020-10-20 | End: 2020-10-20 | Stop reason: HOSPADM

## 2020-10-20 RX ORDER — FENTANYL CITRATE 50 UG/ML
25-200 INJECTION, SOLUTION INTRAMUSCULAR; INTRAVENOUS
Status: DISCONTINUED | OUTPATIENT
Start: 2020-10-20 | End: 2020-10-20 | Stop reason: HOSPADM

## 2020-10-20 RX ORDER — ONDANSETRON 4 MG/1
4 TABLET, ORALLY DISINTEGRATING ORAL AS NEEDED
Status: DISCONTINUED | OUTPATIENT
Start: 2020-10-20 | End: 2020-10-20 | Stop reason: HOSPADM

## 2020-10-20 RX ORDER — LIDOCAINE HYDROCHLORIDE AND EPINEPHRINE 10; 10 MG/ML; UG/ML
1-20 INJECTION, SOLUTION INFILTRATION; PERINEURAL
Status: DISCONTINUED | OUTPATIENT
Start: 2020-10-20 | End: 2020-10-20 | Stop reason: HOSPADM

## 2020-10-20 RX ORDER — HEPARIN SODIUM 200 [USP'U]/100ML
500 INJECTION, SOLUTION INTRAVENOUS
Status: COMPLETED | OUTPATIENT
Start: 2020-10-20 | End: 2020-10-20

## 2020-10-20 RX ORDER — CEFAZOLIN SODIUM/WATER 2 G/20 ML
2 SYRINGE (ML) INTRAVENOUS ONCE
Status: DISCONTINUED | OUTPATIENT
Start: 2020-10-20 | End: 2020-10-20

## 2020-10-20 RX ORDER — MIDAZOLAM HYDROCHLORIDE 1 MG/ML
.5-4 INJECTION, SOLUTION INTRAMUSCULAR; INTRAVENOUS
Status: DISCONTINUED | OUTPATIENT
Start: 2020-10-20 | End: 2020-10-20 | Stop reason: HOSPADM

## 2020-10-20 RX ORDER — CLINDAMYCIN PHOSPHATE 900 MG/50ML
900 INJECTION INTRAVENOUS
Status: DISCONTINUED | OUTPATIENT
Start: 2020-10-20 | End: 2020-10-20 | Stop reason: RX

## 2020-10-20 RX ORDER — HYDROMORPHONE HYDROCHLORIDE 2 MG/ML
1 INJECTION, SOLUTION INTRAMUSCULAR; INTRAVENOUS; SUBCUTANEOUS
Status: DISCONTINUED | OUTPATIENT
Start: 2020-10-20 | End: 2020-10-20 | Stop reason: HOSPADM

## 2020-10-20 RX ADMIN — MIDAZOLAM HYDROCHLORIDE 1 MG: 1 INJECTION, SOLUTION INTRAMUSCULAR; INTRAVENOUS at 14:50

## 2020-10-20 RX ADMIN — LIDOCAINE HYDROCHLORIDE 10 ML: 10 INJECTION, SOLUTION INFILTRATION; PERINEURAL at 15:06

## 2020-10-20 RX ADMIN — SODIUM CHLORIDE: 900 IRRIGANT IRRIGATION at 14:53

## 2020-10-20 RX ADMIN — SODIUM CHLORIDE 25 ML/HR: 900 INJECTION, SOLUTION INTRAVENOUS at 11:31

## 2020-10-20 RX ADMIN — CLINDAMYCIN PHOSPHATE 900 MG: 900 INJECTION, SOLUTION INTRAVENOUS at 14:51

## 2020-10-20 RX ADMIN — HEPARIN SODIUM IN SODIUM CHLORIDE 1000 UNITS: 200 INJECTION INTRAVENOUS at 14:57

## 2020-10-20 RX ADMIN — HEPARIN SODIUM (PORCINE) LOCK FLUSH IV SOLN 100 UNIT/ML 300 UNITS: 100 SOLUTION at 15:02

## 2020-10-20 RX ADMIN — LIDOCAINE HYDROCHLORIDE AND EPINEPHRINE 20 ML: 10; 10 INJECTION, SOLUTION INFILTRATION; PERINEURAL at 15:07

## 2020-10-20 RX ADMIN — FENTANYL CITRATE 50 MCG: 50 INJECTION, SOLUTION INTRAMUSCULAR; INTRAVENOUS at 14:50

## 2020-10-20 NOTE — PROCEDURES
IJ Mediport Op Note    Date of Surgery: 10/20/2020  Surgeon(s):  Nando Guerrero DO FACS  Assistant(s):NONE  Pre-operative Diagnosis: Need for vascular access for CHEMOTHERAPY FOR LUNG ADENOCARCINOMA  Post-operative Diagnosis: same as preop diagnosis  Procedure(s) Performed:  Insertion SINGLE lumen Mediport Right IJ Vein with Ultrasound guidance  Anesthesia:  Moderate Sedation and 1% lidocaine injected locally 1 mg Versed 50 mcg Fentanyl Justin monitored under my direct supervision from 1450 to 1511  Findings: Good tip position and good aspiration and flushing  Complications:  None  Estimated Blood Loss:  Minimal  Tubes and Drains:  None  Specimens: None  Disposition: PACU then Home  Implant: Mediport   Blood Transfused: none    The Right neck and chest were prepped and draped in sterile manner. Using Site-Rite ultrasound the RIGHT INTERNAL JUGULAR VEIN was identified and cannulated using the Seldinger technique. Wire placement was confirmed with fluoroscopy. A subcutaneous pocket was created on the RIGHT anterior chest wall in the infraclavicular position. Hemostasis was achieved with cautery. A tunnel was then created between the IJ stick site and the subcutaneous pocket. A SINGLE lumen MediPort was placed in the pocket and tacked to the chest wall using nylon sutures. The catheter was tunneled over to the stick site and cut to an appropriate length. A dilator and sheath was placed over the wire under fluoroscopic guidance without difficulty. The dilator and wire removed and the catheter placed through the sheath and the sheath removed without difficulty. The catheter was in good position by fluoroscopic evaluation and functioned well and aspirated and flushed easily. . The wounds were then irrigated and closed with Vicryl in the subcutaneous tissue and Monocryl in the skin. Dermabond was then used and a sterile dressing applied. The MediPort is now ready for use.  Images were saved to PACS showing no pneumothorax and good catheter tip position.     [address] [place] [address] [facility]  [phone]  Pager [phone]

## 2020-10-20 NOTE — PERIOP NOTES
Reviewed PTA medication list with patient/caregiver and patient/caregiver denies any additional medications. Patient admits to having a responsible adult care for them for at least 24 hours after surgery.   PREP AND READY FOR THE PROCEDURE.

## 2020-10-20 NOTE — PROGRESS NOTES
1525 Pt back top care unit. Port placed to rt chest , tegaderm dry and intact. Ice packs placed. 1630 discharge instructions reviewed with pt and spouse and they verbalized all understandings. 26 Pt escorted to car and left with spouse in stable condition.

## 2020-10-20 NOTE — DISCHARGE INSTRUCTIONS
Patient Education        Hemodialysis Access Surgery: What to Expect at Home  Your Recovery  Hemodialysis is a way to remove wastes from the blood when your kidneys can no longer do the job. It's not a cure, but it can help you live longer and feel better. It's a lifesaving treatment when you have kidney failure. Hemodialysis is often called dialysis. Your doctor created a place (called an access) in your arm for your blood to flow in and out of your body during your dialysis sessions. Your arm will probably be bruised and swollen. It may hurt. The cut (incision) may bleed. The pain and bleeding will get better over several days. You will probably need only over-the-counter pain medicine. You can reduce swelling by propping up your arm on 1 or 2 pillows and keeping your elbow straight. You will have stitches. These may dissolve on their own, or your doctor will tell you when to come in to have them removed. You should also be able to return to work in a few days. You may feel some coolness or numbness in your hand. These feelings usually go away in a few weeks. Your doctor may suggest squeezing a soft object. This will strengthen your access and may make hemodialysis faster and easier. You should always be able to feel blood rushing through the fistula or graft. It feels like a slight vibration when you put your fingers on the skin over the fistula or graft. This feeling is called a thrill or a pulse. This care sheet gives you a general idea about how long it will take for you to recover. But each person recovers at a different pace. Follow the steps below to get better as quickly as possible. How can you care for yourself at home? Activity    · Rest when you feel tired. Getting enough sleep will help you recover.  Do not lie on or sleep on the arm with the access.     · Avoid activities such as washing windows or gardening that put stress on the arm with the access.     · You may use your arm, but do not lift anything that weighs more than about 15 pounds. This may include a child, heavy grocery bags, a heavy briefcase or backpack, cat litter or dog food bags, or a vacuum .     · You can shower, but keep the access dry for the first 2 days. Cover the area with a plastic bag to keep it dry.     · Do not soak or scrub the incision until it has healed.     · Wear an arm guard to protect the area if you play sports or work with your arms.     · You may drive when your doctor says it is okay. This is usually in 1 to 2 days.     · Most people are able to return to work about 1 or 2 days after surgery. Diet    · Follow an eating plan that is good for your kidneys. A registered dietitian can help you make a meal plan that is right for you. You may need to limit protein, salt, fluids, and certain foods. Medicines    · Your doctor will tell you if and when you can restart your medicines. He or she will also give you instructions about taking any new medicines.     · If you take aspirin or some other blood thinner, ask your doctor if and when to start taking it again. Make sure that you understand exactly what your doctor wants you to do.     · Take pain medicines exactly as directed. ? If the doctor gave you a prescription medicine for pain, take it as prescribed. ? If you are not taking a prescription pain medicine, ask your doctor if you can take acetaminophen (Tylenol). Do not take ibuprofen (Advil, Motrin) or naproxen (Aleve), or similar medicines, unless your doctor tells you to. They may make chronic kidney disease worse. ? Do not take two or more pain medicines at the same time unless the doctor told you to. Many pain medicines have acetaminophen, which is Tylenol. Too much acetaminophen (Tylenol) can be harmful.     · If you think your pain medicine is making you sick to your stomach:  ? Take your medicine after meals (unless your doctor has told you not to). ? Ask your doctor for a different pain medicine.   · If your doctor prescribed antibiotics, take them as directed. Do not stop taking them just because you feel better. You need to take the full course of antibiotics. Incision care    · Keep the area dry for 2 days. After 2 days, wash the area with soap and water every day, and always before dialysis.     · Do not soak or scrub the incision until it has healed.     · If you have a bandage, change it every day or as your doctor recommends. Your doctor will tell you when you can remove it. Exercise    · Squeeze a soft ball or other object as your doctor tells you. This will help blood flow through the access and help prevent blood clots. Elevation    · Prop up the sore arm on a pillow anytime you sit or lie down during the next 3 days. Try to keep it above the level of your heart. This will help reduce swelling. Other instructions    · Every day, check your access for a pulse or thrill in the fistula or graft area. A thrill is a vibration. To feel a pulse or thrill, place the first two fingers of your hand over the access.     · Do not bump your arm.     · Do not wear tight clothing, jewelry, or anything else that may squeeze the access.     · Use your other arm to have blood drawn or blood pressure taken.     · Do not put cream or lotion on or near the access.     · Make sure all doctors you deal with know that you have a vascular access. Follow-up care is a key part of your treatment and safety. Be sure to make and go to all appointments, and call your doctor if you are having problems. It's also a good idea to know your test results and keep a list of the medicines you take. When should you call for help? Call 911 anytime you think you may need emergency care. For example, call if:    · You passed out (lost consciousness).     · You have chest pain, are short of breath, or cough up blood.    Call your doctor now or seek immediate medical care if:    · Your hand or arm is cold or dark-colored.     · You have no pulse in your access.     · You have nausea or you vomit.     · You have pain that does not get better after you take pain medicine.     · You have loose stitches, or your incision comes open.     · You are bleeding from the incision.     · You have signs of infection, such as:  ? Increased pain, swelling, warmth, or redness. ? Red streaks leading from the area. ? Pus draining from the area. ? A fever.     · You have signs of a blood clot in your leg (called a deep vein thrombosis), such as:  ? Pain in your calf, back of the knee, thigh, or groin. ? Redness or swelling in your leg. Watch closely for changes in your health, and be sure to contact your doctor if you have any problems. Where can you learn more? Go to http://www.gray.com/  Enter P616 in the search box to learn more about \"Hemodialysis Access Surgery: What to Expect at Home. \"  Current as of: April 15, 2020               Content Version: 12.6  © 1587-7209 [employer]. Care instructions adapted under license by [employer] (which disclaims liability or warranty for this information). If you have questions about a medical condition or this instruction, always ask your healthcare professional. [place] [address] any warranty or liability for your use of this information. DISCHARGE SUMMARY from Nurse    PATIENT INSTRUCTIONS:    After general anesthesia or intravenous sedation, for 24 hours or while taking prescription Narcotics:  · Limit your activities  · Do not drive and operate hazardous machinery  · Do not make important personal or business decisions  · Do  not drink alcoholic beverages  · If you have not urinated within 8 hours after discharge, please contact your surgeon on call.     Report the following to your surgeon:  · Excessive pain, swelling, redness or odor of or around the surgical area  · Temperature over 100.5  · Nausea and vomiting lasting longer than 4 hours or if unable to take medications  · Any signs of decreased circulation or nerve impairment to extremity: change in color, persistent  numbness, tingling, coldness or increase pain  · Any questions    What to do at Home:  Recommended activity: AS ABOVE     If you experience any of the following symptoms AS ABOVE , please follow up with PCP. *  Please give a list of your current medications to your Primary Care Provider. *  Please update this list whenever your medications are discontinued, doses are      changed, or new medications (including over-the-counter products) are added. *  Please carry medication information at all times in case of emergency situations. These are general instructions for a healthy lifestyle:    No smoking/ No tobacco products/ Avoid exposure to second hand smoke  Surgeon General's Warning:  Quitting smoking now greatly reduces serious risk to your health. Obesity, smoking, and sedentary lifestyle greatly increases your risk for illness    A healthy diet, regular physical exercise & weight monitoring are important for maintaining a healthy lifestyle    You may be retaining fluid if you have a history of heart failure or if you experience any of the following symptoms:  Weight gain of 3 pounds or more overnight or 5 pounds in a week, increased swelling in our hands or feet or shortness of breath while lying flat in bed. Please call your doctor as soon as you notice any of these symptoms; do not wait until your next office visit. The discharge information has been reviewed with the patient and spouse. The patient and spouse verbalized understanding. Discharge medications reviewed with the patient and spouse and appropriate educational materials and side effects teaching were provided. Patient armband removed and shreddedPatient Education        Implanted Port: What to Expect at [facility] have had a procedure to implant a port.  A port is a device placed, in most cases, under the skin of your chest below your collarbone. It is made of plastic, stainless steel, or titanium. It's about the size of a quarter, but thicker. It looks like a small bump under your skin. A thin, flexible tube called a catheter runs under the skin from the port into a large vein. With the port, you will be able to get medicines (such as chemotherapy) with more comfort. You also can get blood, nutrients, or other fluids. Blood can be taken through the port for tests. You will probably have some discomfort and bruising at the port site. This will go away in a few days. The port can be used right away. You may have the port for weeks, months, or longer. Your port will need to be flushed out regularly to keep it open. A nurse or other health professional will do this for you. This care sheet gives you a general idea about how long it will take for you to recover. But each person recovers at a different pace. Follow the steps below to feel better as quickly as possible. How can you care for yourself at home? Activity    · Avoid arm and upper body movements that may pull on the catheter for the first few days. These movements include heavy weight lifting and vigorous use of your arms.     · You will probably need to take 1 day off from work and will be able to return to normal activities shortly after. This depends on the type of work you do, why you have the catheter, and how you feel.     · You probably will be able to take baths and swim. But you may need to avoid some activities. Talk to your doctor about any limits on your activity.     · Ask your doctor when you can drive again. Be careful when you pull your seat belt across your chest so it doesn't pull out the catheter. It's okay if the seat belt lays over the catheter. Medicines    · Your doctor will tell you if and when you can restart your medicines.  He or she will also give you instructions about taking any new medicines.     · If you take aspirin or some other blood thinner, ask your doctor if and when to start taking it again. Make sure that you understand exactly what your doctor wants you to do.     · Be safe with medicines. Read and follow all instructions on the label. ? If the doctor gave you a prescription medicine for pain, take it as prescribed. ? If you are not taking a prescription pain medicine, ask your doctor if you can take an over-the-counter medicine. Incision care    · If you have a bandage, your doctor will tell you when you can remove it. After you remove the bandage, you may shower. Wash the area with soap and water and pat it dry. Don't use hydrogen peroxide or alcohol, which can slow healing. You may cover the area with a gauze bandage if it weeps or rubs against clothing. Change the bandage every day.     · If you have strips of tape on the cut (incision) the doctor made, leave the tape on for a week or until it falls off. Other instructions    · Always carry the medical alert card that your doctor gives you. It contains information about your port. It will tell health care workers that you have a port in case you need emergency care.     · When you get dressed, be careful not to rub the port. Do not wear a bra or suspenders that irritate your skin near the port. Follow-up care is a key part of your treatment and safety. Be sure to make and go to all appointments, and call your doctor if you are having problems. It's also a good idea to know your test results and keep a list of the medicines you take. When should you call for help? Call your doctor now or seek immediate medical care if:    · You have signs of infection, such as:  ? Increased pain, swelling, warmth, or redness. ? Red streaks leading from the area. ? Pus draining from the area. ? A fever.     · You have pain or swelling in your neck or arm.     · You have trouble breathing.    Watch closely for changes in your health, and be sure to contact your doctor if:    · You have any problems with your line or port. Where can you learn more? Go to http://www.gray.com/  Enter M256 in the search box to learn more about \"Implanted Port: What to Expect at Home. \"  Current as of: June 26, 2019               Content Version: 12.6  © 0480-3562 [employer]. Care instructions adapted under license by [employer] (which disclaims liability or warranty for this information). If you have questions about a medical condition or this instruction, always ask your healthcare professional. [address] any warranty or liability for your use of this information.     Patient armband removed and shredded             ___________________________________________________________________________________________________________________________________

## 2020-10-20 NOTE — H&P
History and Physical    Patient: Bianka Laboy MRN: [id number]  SSN: [id number]    YOB: 1945  Age: 70 y.o. Sex: female      Subjective:      Bianka Laboy is a 70 y.o. female who presents for mediport insertion for primary lung adenocarcinoma    Past Medical History:   Diagnosis Date    Arrhythmia     'palpitation'    Cancer [facility])     lung cancer WITH RADIATION THERAPY    COPD     GERD (gastroesophageal reflux disease)     well controlled with meds    HTN (hypertension) 2017    Hx-TIA (transient ischemic attack)     aggrenox    Ill-defined condition     Migraine     amitriptyline    Stroke ([facility] [address]     TIA     Past Surgical History:   Procedure Laterality Date    HX CARPAL TUNNEL RELEASE      HX CHOLECYSTECTOMY      HX CYST REMOVAL      HX GI      COLONOSCOPY    HX HEENT      tonsils    HX HYSTERECTOMY      HX ORTHOPAEDIC      carpal tunnel right    HX ORTHOPAEDIC      left shoulder surgery    NEUROLOGICAL PROCEDURE UNLISTED      neck surgery      Family History   Problem Relation Age of Onset    Diabetes Father     Hypertension Father     Heart Disease Father     Stroke Father     Cancer Sister     Post-op Nausea/Vomiting Mother     Malignant Hyperthermia Neg Hx     Pseudocholinesterase Deficiency Neg Hx     Delayed Awakening Neg Hx     Emergence Delirium Neg Hx     Post-op Cognitive Dysfunction Neg Hx      Social History     Tobacco Use    Smoking status: Former Smoker     Packs/day: 1.00     Last attempt to quit: 1998     Years since quittin.2    Smokeless tobacco: Never Used   Substance Use Topics    Alcohol use: Never     Frequency: Never      Prior to Admission medications    Medication Sig Start Date End Date Taking? Authorizing Provider   albuterol-ipratropium (DUO-NEB) 2.5 mg-0.5 mg/3 ml nebu 3 mL by Nebulization route every six (6) hours as needed for Wheezing.    Yes Provider, Historical   albuterol (ProAir HFA) 90 mcg/actuation inhaler Take 2 Puffs by inhalation. Yes Provider, Historical   Oxygen 3 liters prn   Yes Provider, Historical   cyanocobalamin 1,000 mcg tablet Take 1,000 mcg by mouth daily. Yes Other, MD Darío   atorvastatin (LIPITOR) 80 mg tablet Take 80 mg by mouth every evening. Yes Other, MD Darío   ferrous sulfate (IRON) 325 mg (65 mg iron) tablet Take  by mouth Daily (before breakfast). Yes Other, MD Darío   biotin 1,000 mcg chew Take 1,000 mcg by mouth four (4) times daily. This herbal, alternative, and/or nutritional/dietary supplement product will not be given during hospital stay per [facility] approved policy. Please reorder upon discharge if appropriate. Yes Provider, Historical   calcium-cholecalciferol, D3, (CALTRATE 600+D) tablet Take 1 Tab by mouth daily. Yes Provider, Historical   fluticasone-salmeterol (ADVAIR) 500-50 mcg/dose diskus inhaler Take 1 Puff by inhalation every twelve (12) hours. Yes Provider, Historical   pantoprazole (PROTONIX) 40 mg tablet Take 40 mg by mouth two (2) times a day. Yes Provider, Historical   cholecalciferol (VITAMIN D3) 400 unit tab tablet Take 400 Units by mouth daily. Yes Provider, Historical   dipyridamole-aspirin (AGGRENOX) 200-25 mg per SR capsule Take 1 Cap by mouth two (2) times a day. Indications: PREVENTION OF CEREBRAL THROMBOSIS, h/o TIA   Yes Provider, Historical   ezetimibe (ZETIA) 10 mg tablet Take 10 mg by mouth every evening. Yes Provider, Historical   tiotropium (SPIRIVA WITH HANDIHALER) 18 mcg inhalation capsule Take 1 Cap by inhalation daily. Yes Provider, Historical        Allergies   Allergen Reactions    Augmentin [Amoxicillin-Pot Clavulanate] Diarrhea    Entex [Phenylephrine-Guaifenesin] Diarrhea    Levaquin [Levofloxacin] Palpitations    Neurontin [Gabapentin] Other (comments)     Slurred speech       Review of Systems:  A comprehensive review of systems was negative.     Objective:     Vitals:    10/20/20 1102 10/20/20 1121   BP: 103/61    Pulse: (!) 108    Resp: 16    Temp: 97.7 °F (36.5 °C)    SpO2: 93%    Weight: 64.5 kg (142 lb 1.6 oz) 64.5 kg (142 lb 1.6 oz)   Height: 5' 8\" (1.727 m) 5' 8\" (1.727 m)        Physical Exam:  GENERAL: alert, cooperative, no distress, appears stated age  LUNG: clear to auscultation bilaterally  HEART: regular rate and rhythm, S1, S2 normal, no murmur, click, rub or gallop  ABDOMEN: soft, non-tender.  Bowel sounds normal. No masses,  no organomegaly  EXTREMITIES:  extremities normal, atraumatic, no cyanosis or edema  SKIN: Normal.  NEUROLOGIC: negative    Assessment:     Hospital Problems  Date Reviewed: 12/27/2017    None        PRIMARY LUNG ADENOCARCINOMA    Plan:     MEDIPORT INSERTION WITH ULTRASOUND GUIDANCE    Signed By: Ambika Lucas MD     October 20, 2020

## 2020-10-20 NOTE — PROGRESS NOTES
TRANSFER - OUT REPORT:    Verbal report given to RXO Lopez(name) on Stacia Jimenez  being transferred to Care Unit(unit) for routine progression of care       Report consisted of patients Situation, Background, Assessment and   Recommendations(SBAR). Information from the following report(s) SBAR, Kardex and MAR was reviewed with the receiving nurse. Lines:   Venous Access Device RIJ Single lumen mediport 10/20/20 Upper chest (subclavicular area, right (Active)   Central Line Being Utilized No 10/20/20 1500   Criteria for Appropriate Use Irritant/vesicant medication 10/20/20 1500   Site Assessment Clean, dry, & intact 10/20/20 1500   Dressing Type Topical skin adhesive;Non-adherent dressing;Transparent 10/20/20 1500       Peripheral IV 10/20/20 Right Antecubital (Active)   Site Assessment Clean, dry, & intact 10/20/20 1130   Phlebitis Assessment 0 10/20/20 1130   Infiltration Assessment 0 10/20/20 1130   Dressing Status Clean, dry, & intact 10/20/20 1130   Dressing Type Transparent;Tape 10/20/20 1130   Hub Color/Line Status Blue; Infusing 10/20/20 1130        Opportunity for questions and clarification was provided.       Patient transported with:   Registered Nurse

## 2023-11-21 NOTE — ED TRIAGE NOTES
C/o cough, congestion, fever since last week, seen by [facility] in [place] last Friday, given prednisone and doxy, sx's worse since the weekend, increased shortness of breath with exertion. Sepsis Screening completed    ( x )Patient meets SIRS criteria. (  )Patient does not meet SIRS criteria.       SIRS Criteria is achieved when two or more of the following are present   Temperature < 96.8°F (36°C) or > 100.9°F (38.3°C)   Heart Rate > 90 beats per minute   Respiratory Rate > 20 breaths per minute   WBC count > 12,000 or <4,000 or > 10% bands Clindamycin Pregnancy And Lactation Text: This medication can be used in pregnancy if certain situations. Clindamycin is also present in breast milk.